# Patient Record
Sex: MALE | Race: BLACK OR AFRICAN AMERICAN
[De-identification: names, ages, dates, MRNs, and addresses within clinical notes are randomized per-mention and may not be internally consistent; named-entity substitution may affect disease eponyms.]

---

## 2018-12-13 ENCOUNTER — HOSPITAL ENCOUNTER (INPATIENT)
Dept: HOSPITAL 62 - ER | Age: 43
LOS: 7 days | Discharge: HOME | DRG: 638 | End: 2018-12-20
Attending: FAMILY MEDICINE | Admitting: FAMILY MEDICINE
Payer: COMMERCIAL

## 2018-12-13 DIAGNOSIS — I10: ICD-10-CM

## 2018-12-13 DIAGNOSIS — E86.0: ICD-10-CM

## 2018-12-13 DIAGNOSIS — N17.9: ICD-10-CM

## 2018-12-13 DIAGNOSIS — E11.10: Primary | ICD-10-CM

## 2018-12-13 DIAGNOSIS — Z91.14: ICD-10-CM

## 2018-12-13 LAB
ADD MANUAL DIFF: NO
ALBUMIN SERPL-MCNC: 5.1 G/DL (ref 3.5–5)
ALP SERPL-CCNC: 87 U/L (ref 38–126)
ALT SERPL-CCNC: 27 U/L (ref 21–72)
ANION GAP SERPL CALC-SCNC: 34 MMOL/L (ref 5–19)
AST SERPL-CCNC: 20 U/L (ref 17–59)
BASE EXCESS BLDV CALC-SCNC: -17.7 MMOL/L
BASOPHILS # BLD AUTO: 0.1 10^3/UL (ref 0–0.2)
BASOPHILS NFR BLD AUTO: 0.4 % (ref 0–2)
BILIRUB DIRECT SERPL-MCNC: 0.4 MG/DL (ref 0–0.4)
BILIRUB SERPL-MCNC: 0.6 MG/DL (ref 0.2–1.3)
BUN SERPL-MCNC: 26 MG/DL (ref 7–20)
CALCIUM: 10 MG/DL (ref 8.4–10.2)
CHLORIDE SERPL-SCNC: 98 MMOL/L (ref 98–107)
CO2 SERPL-SCNC: 9 MMOL/L (ref 22–30)
EOSINOPHIL # BLD AUTO: 0 10^3/UL (ref 0–0.6)
EOSINOPHIL NFR BLD AUTO: 0 % (ref 0–6)
ERYTHROCYTE [DISTWIDTH] IN BLOOD BY AUTOMATED COUNT: 13.1 % (ref 11.5–14)
GLUCOSE SERPL-MCNC: 616 MG/DL (ref 75–110)
HCO3 BLDV-SCNC: 11.5 MMOL/L (ref 20–32)
HCT VFR BLD CALC: 54.4 % (ref 37.9–51)
HGB BLD-MCNC: 17.9 G/DL (ref 13.5–17)
LYMPHOCYTES # BLD AUTO: 1 10^3/UL (ref 0.5–4.7)
LYMPHOCYTES NFR BLD AUTO: 6.6 % (ref 13–45)
MCH RBC QN AUTO: 31.6 PG (ref 27–33.4)
MCHC RBC AUTO-ENTMCNC: 32.9 G/DL (ref 32–36)
MCV RBC AUTO: 96 FL (ref 80–97)
MONOCYTES # BLD AUTO: 0.6 10^3/UL (ref 0.1–1.4)
MONOCYTES NFR BLD AUTO: 3.8 % (ref 3–13)
NEUTROPHILS # BLD AUTO: 13.9 10^3/UL (ref 1.7–8.2)
NEUTS SEG NFR BLD AUTO: 89.2 % (ref 42–78)
PCO2 BLDV: 39.1 MMHG (ref 35–63)
PH BLDV: 7.09 [PH] (ref 7.3–7.42)
PLATELET # BLD: 225 10^3/UL (ref 150–450)
POTASSIUM SERPL-SCNC: 5.5 MMOL/L (ref 3.6–5)
PROT SERPL-MCNC: 8.3 G/DL (ref 6.3–8.2)
RBC # BLD AUTO: 5.66 10^6/UL (ref 4.35–5.55)
SODIUM SERPL-SCNC: 140.6 MMOL/L (ref 137–145)
TOTAL CELLS COUNTED % (AUTO): 100 %
WBC # BLD AUTO: 15.6 10^3/UL (ref 4–10.5)

## 2018-12-13 PROCEDURE — 80053 COMPREHEN METABOLIC PANEL: CPT

## 2018-12-13 PROCEDURE — 82803 BLOOD GASES ANY COMBINATION: CPT

## 2018-12-13 PROCEDURE — 86701 HIV-1ANTIBODY: CPT

## 2018-12-13 PROCEDURE — 83735 ASSAY OF MAGNESIUM: CPT

## 2018-12-13 PROCEDURE — 80048 BASIC METABOLIC PNL TOTAL CA: CPT

## 2018-12-13 PROCEDURE — 36415 COLL VENOUS BLD VENIPUNCTURE: CPT

## 2018-12-13 PROCEDURE — 82962 GLUCOSE BLOOD TEST: CPT

## 2018-12-13 PROCEDURE — 85025 COMPLETE CBC W/AUTO DIFF WBC: CPT

## 2018-12-13 PROCEDURE — 81001 URINALYSIS AUTO W/SCOPE: CPT

## 2018-12-13 PROCEDURE — 80307 DRUG TEST PRSMV CHEM ANLYZR: CPT

## 2018-12-13 PROCEDURE — 93010 ELECTROCARDIOGRAM REPORT: CPT

## 2018-12-13 PROCEDURE — S0028 INJECTION, FAMOTIDINE, 20 MG: HCPCS

## 2018-12-13 PROCEDURE — 99291 CRITICAL CARE FIRST HOUR: CPT

## 2018-12-13 PROCEDURE — 96360 HYDRATION IV INFUSION INIT: CPT

## 2018-12-13 PROCEDURE — 83036 HEMOGLOBIN GLYCOSYLATED A1C: CPT

## 2018-12-13 PROCEDURE — 93005 ELECTROCARDIOGRAM TRACING: CPT

## 2018-12-13 NOTE — ER DOCUMENT REPORT
ED General





- General


Chief Complaint: High Blood Sugar


Stated Complaint: POSSIBLE HIGH BLOOD SUGAR


Time Seen by Provider: 12/13/18 22:05


Notes: 


Patient is a 43-year-old male with a history of type 2 diabetes who presents 

with nausea, fluctuating blood sugars, and feeling very dry and weak.  Patient 

says he felt like this once before and this was 2 years ago when he was first 

diagnosed with diabetes and was in DKA.  He is visiting from [place].  He 

supposed to be on a sliding scale insulin but has not been using his insulin.  

He is not on any oral medications.  He denies recent fevers or infections.  He 

denies pain except for some pain on his tongue from where he says he has some 

sores.  He has no history of any medical allergies.  He has no other complaints 

at this time.








- Related Data


Allergies/Adverse Reactions: 


 





No Known Allergies Allergy (Verified 12/13/18 21:24)


 











Past Medical History





- Social History


Smoking Status: Unknown if Ever Smoked


Frequency of alcohol use: None


Drug Abuse: None


Family History: Reviewed & Not Pertinent





Review of Systems





- Review of Systems


Notes: 





My Normal Review Basic





REVIEW OF SYSTEMS:


CONSTITUTIONAL :  Denies fever,  chills, or sweats.  Generalized weakness


EENT:   Denies eye, ear, throat, or mouth pain or symptoms.  Denies nasal or 

sinus congestion.


CARDIOVASCULAR:  Denies chest pain.


RESPIRATORY:  Denies cough, cold, or chest congestion.  Denies shortness of 

breath, difficulty breathing, or wheezing.


GASTROINTESTINAL:  Denies abdominal pain.  Nausea


GENITOURINARY:  Denies difficulty urinating, painful urination, burning, 

frequency, or blood in urine.


MUSCULOSKELETAL:  Denies neck or back pain or joint pain or swelling.


SKIN:   Denies rash or skin lesions.


NEUROLOGICAL:  Denies altered mental status or loss of consciousness. 


ALL OTHER SYSTEMS REVIEWED AND NEGATIVE.





Physical Exam





- Vital signs


Vitals: 


 











Temp Pulse Resp BP Pulse Ox


 


 97.4 F   131 H  17   111/87 H  98 


 


 12/13/18 21:31  12/13/18 21:31  12/13/18 21:31  12/13/18 21:31  12/13/18 21:31














- Notes


Notes: 





General Appearance: Well nourished, alert, cooperative, patient has Kussmals 

type respiration.


Vitals: reviewed, See vital signs table.


Head: no swelling or tenderness to the head


Eyes: PERRL, EOMI, Conjuctiva clear


Mouth: Dry tongue.  Dry mucous membranes.


Throat: No tonsillar inflammation, No airway obstruction, 


Neck: Supple, no neck tenderness, No thyromegaly


Lungs: No wheezing, No rales, No rhonci, No accessory muscle use, good air 

exchange bilaterally.


Heart: Tachycardic rate, Regular rythm, No murmur, no rub


Abdomen: Normal BS, soft, No rigidity, No abdominal tenderness, No guarding, no 

rebound, no abdominal masses, no organomegaly


Extremities:  good pulses in all extremities, no swelling or tenderness in the 

extremities, no edema.


Skin: warm, dry, appropriate color, no rash


Neuro: speech clear, oriented x 3, normal affect, responds appropriately to 

questions.





Course





- Re-evaluation


Re-evalutation: 





12/14/18 05:36


Patient's blood work did show he is acidotic and had findings consistent with 

DKA.  Potassium was slightly elevated related to his acidosis.  Patient was 

placed on maintenance IV drip after receiving the 2 her IV fluid bolus.  

Patient then placed on insulin drip.  I called and spoke with the hospitalist, 

Dr. Weiland, who agreed to accept the patient for admission.





Dictation of this chart was performed using voice recognition software; 

therefore, there may be some unintended grammatical errors.





- Vital Signs


Vital signs: 


 











Temp Pulse Resp BP Pulse Ox


 


 97.4 F   98   17   155/88 H  96 


 


 12/13/18 21:31  12/14/18 04:50  12/14/18 02:37  12/14/18 02:37  12/14/18 02:37














- Laboratory


Result Diagrams: 


 12/14/18 03:56





 12/14/18 03:59


Laboratory results interpreted by me: 


 











  12/13/18 12/13/18 12/13/18





  21:39 22:47 22:47


 


WBC   15.6 H 


 


RBC   5.66 H 


 


Hgb   17.9 H 


 


Hct   54.4 H 


 


Seg Neutrophils %   89.2 H 


 


Lymphocytes %   6.6 L 


 


Absolute Neutrophils   13.9 H 


 


VBG pH   


 


VBG HCO3   


 


Potassium    5.5 H


 


Carbon Dioxide    9 L*


 


Anion Gap    34 H


 


BUN    26 H


 


Creatinine    1.40 H


 


Est GFR (Non-Af Amer)    56 L


 


Glucose    616 H*


 


POC Glucose  502 H*  


 


Total Protein    8.3 H


 


Albumin    5.1 H


 


Urine Protein   


 


Urine Glucose (UA)   


 


Urine Ketones   














  12/13/18 12/13/18 12/14/18





  22:47 22:47 00:16


 


WBC   


 


RBC   


 


Hgb   


 


Hct   


 


Seg Neutrophils %   


 


Lymphocytes %   


 


Absolute Neutrophils   


 


VBG pH  7.09 L*  


 


VBG HCO3  11.5 L  


 


Potassium   


 


Carbon Dioxide   


 


Anion Gap   


 


BUN   


 


Creatinine   


 


Est GFR (Non-Af Amer)   


 


Glucose   


 


POC Glucose    447 H*


 


Total Protein   


 


Albumin   


 


Urine Protein   30 H 


 


Urine Glucose (UA)   >=500 H 


 


Urine Ketones   80 H 














- EKG Interpretation by Me


Additional EKG results interpreted by me: 





12/13/18 22:34


EKG is reviewed and interpreted by me.  EKG shows sinus tachycardia with a rate 

of 111 bpm.  No ST segment elevation or depression.  No ischemic T wave 

inversions.  OK interval, QRS duration are within normal range.  QT interval is 

prolonged.  No old EKG available for comparison.





Critical Care Note





- Critical Care Note


Total time excluding time spent on procedures (mins): 40


Comments: 





Critical care time for this patient including time spent on procedures proximal 

40 minutes due to frequent reevaluation rehydration with saline, management of 

insulin drip.





Discharge





- Discharge


Clinical Impression: 


DKA (diabetic ketoacidoses)


Qualifiers:


 Diabetes mellitus type: type 2 Diabetes mellitus complication detail: without 

coma Qualified Code(s): E11.10 - Type 2 diabetes mellitus with ketoacidosis 

without coma





Condition: Stable


Disposition: ADMITTED AS INPATIENT


Admitting Provider: Hospitalist


Unit Admitted: ICU

## 2018-12-14 LAB
ADD MANUAL DIFF: NO
ANION GAP SERPL CALC-SCNC: 26 MMOL/L (ref 5–19)
ANION GAP SERPL CALC-SCNC: 8 MMOL/L (ref 5–19)
ANION GAP SERPL CALC-SCNC: 9 MMOL/L (ref 5–19)
APPEARANCE UR: CLEAR
APTT PPP: (no result) S
ARTERIAL BLOOD FIO2: (no result)
ARTERIAL BLOOD H2CO3: 1.21 MMOL/L (ref 1.05–1.35)
ARTERIAL BLOOD HCO3: 30.4 MMOL/L (ref 20–24)
ARTERIAL BLOOD PCO2: 40.2 MMHG (ref 35–45)
ARTERIAL BLOOD PH: 7.5 (ref 7.35–7.45)
ARTERIAL BLOOD PO2: 80.1 MMHG (ref 80–100)
ARTERIAL BLOOD TOTAL CO2: 31.7 MMOL/L (ref 23–27)
BARBITURATES UR QL SCN: NEGATIVE
BASE EXCESS BLDA CALC-SCNC: 6.7 MMOL/L
BASOPHILS # BLD AUTO: 0 10^3/UL (ref 0–0.2)
BASOPHILS NFR BLD AUTO: 0.3 % (ref 0–2)
BILIRUB UR QL STRIP: NEGATIVE
BUN SERPL-MCNC: 17 MG/DL (ref 7–20)
BUN SERPL-MCNC: 20 MG/DL (ref 7–20)
BUN SERPL-MCNC: 24 MG/DL (ref 7–20)
CALCIUM: 8.2 MG/DL (ref 8.4–10.2)
CALCIUM: 8.3 MG/DL (ref 8.4–10.2)
CALCIUM: 9.5 MG/DL (ref 8.4–10.2)
CHLORIDE SERPL-SCNC: 101 MMOL/L (ref 98–107)
CHLORIDE SERPL-SCNC: 105 MMOL/L (ref 98–107)
CHLORIDE SERPL-SCNC: 99 MMOL/L (ref 98–107)
CO2 SERPL-SCNC: 11 MMOL/L (ref 22–30)
CO2 SERPL-SCNC: 28 MMOL/L (ref 22–30)
CO2 SERPL-SCNC: 30 MMOL/L (ref 22–30)
EOSINOPHIL # BLD AUTO: 0 10^3/UL (ref 0–0.6)
EOSINOPHIL NFR BLD AUTO: 0 % (ref 0–6)
ERYTHROCYTE [DISTWIDTH] IN BLOOD BY AUTOMATED COUNT: 12.9 % (ref 11.5–14)
GLUCOSE SERPL-MCNC: 181 MG/DL (ref 75–110)
GLUCOSE SERPL-MCNC: 235 MG/DL (ref 75–110)
GLUCOSE SERPL-MCNC: 509 MG/DL (ref 75–110)
GLUCOSE UR STRIP-MCNC: >=500 MG/DL
HCT VFR BLD CALC: 49.6 % (ref 37.9–51)
HGB BLD-MCNC: 16.6 G/DL (ref 13.5–17)
KETONES UR STRIP-MCNC: 80 MG/DL
LYMPHOCYTES # BLD AUTO: 1.2 10^3/UL (ref 0.5–4.7)
LYMPHOCYTES NFR BLD AUTO: 7.8 % (ref 13–45)
MCH RBC QN AUTO: 31 PG (ref 27–33.4)
MCHC RBC AUTO-ENTMCNC: 33.4 G/DL (ref 32–36)
MCV RBC AUTO: 93 FL (ref 80–97)
METHADONE UR QL SCN: NEGATIVE
MONOCYTES # BLD AUTO: 1.5 10^3/UL (ref 0.1–1.4)
MONOCYTES NFR BLD AUTO: 9.1 % (ref 3–13)
NEUTROPHILS # BLD AUTO: 13.2 10^3/UL (ref 1.7–8.2)
NEUTS SEG NFR BLD AUTO: 82.8 % (ref 42–78)
NITRITE UR QL STRIP: NEGATIVE
PCP UR QL SCN: NEGATIVE
PH UR STRIP: 5 [PH] (ref 5–9)
PLATELET # BLD: 192 10^3/UL (ref 150–450)
POTASSIUM SERPL-SCNC: 2.7 MMOL/L (ref 3.6–5)
POTASSIUM SERPL-SCNC: 3.2 MMOL/L (ref 3.6–5)
POTASSIUM SERPL-SCNC: 4.2 MMOL/L (ref 3.6–5)
PROT UR STRIP-MCNC: 30 MG/DL
RBC # BLD AUTO: 5.34 10^6/UL (ref 4.35–5.55)
SAO2 % BLDA: 96.7 % (ref 94–98)
SODIUM SERPL-SCNC: 134.9 MMOL/L (ref 137–145)
SODIUM SERPL-SCNC: 139.9 MMOL/L (ref 137–145)
SODIUM SERPL-SCNC: 141.7 MMOL/L (ref 137–145)
SP GR UR STRIP: 1.03
TOTAL CELLS COUNTED % (AUTO): 100 %
URINE AMPHETAMINES SCREEN: NEGATIVE
URINE BENZODIAZEPINES SCREEN: NEGATIVE
URINE COCAINE SCREEN: NEGATIVE
URINE MARIJUANA (THC) SCREEN: NEGATIVE
UROBILINOGEN UR-MCNC: NEGATIVE MG/DL (ref ?–2)
WBC # BLD AUTO: 16 10^3/UL (ref 4–10.5)

## 2018-12-14 RX ADMIN — SODIUM BICARBONATE PRN MLS/HR: 84 INJECTION INTRAVENOUS at 04:27

## 2018-12-14 RX ADMIN — SODIUM CHLORIDE PRN MLS/HR: 0.45 INJECTION, SOLUTION INTRAVENOUS at 04:33

## 2018-12-14 RX ADMIN — POTASSIUM CHLORIDE SCH MLS/HR: 29.8 INJECTION, SOLUTION INTRAVENOUS at 18:13

## 2018-12-14 RX ADMIN — LISINOPRIL SCH MG: 10 TABLET ORAL at 10:03

## 2018-12-14 RX ADMIN — HEPARIN SODIUM SCH UNIT: 5000 INJECTION, SOLUTION INTRAVENOUS; SUBCUTANEOUS at 22:31

## 2018-12-14 RX ADMIN — HEPARIN SODIUM SCH UNIT: 5000 INJECTION, SOLUTION INTRAVENOUS; SUBCUTANEOUS at 14:25

## 2018-12-14 RX ADMIN — METOCLOPRAMIDE SCH MG: 5 INJECTION, SOLUTION INTRAMUSCULAR; INTRAVENOUS at 08:11

## 2018-12-14 RX ADMIN — Medication SCH ML: at 14:25

## 2018-12-14 RX ADMIN — Medication SCH: at 07:23

## 2018-12-14 RX ADMIN — FAMOTIDINE SCH MG: 10 INJECTION INTRAVENOUS at 10:04

## 2018-12-14 RX ADMIN — HEPARIN SODIUM SCH UNIT: 5000 INJECTION, SOLUTION INTRAVENOUS; SUBCUTANEOUS at 07:23

## 2018-12-14 RX ADMIN — SODIUM CHLORIDE PRN MLS/HR: 0.45 INJECTION, SOLUTION INTRAVENOUS at 10:07

## 2018-12-14 RX ADMIN — SODIUM BICARBONATE PRN MLS/HR: 84 INJECTION INTRAVENOUS at 07:22

## 2018-12-14 RX ADMIN — METOCLOPRAMIDE SCH MG: 5 INJECTION, SOLUTION INTRAMUSCULAR; INTRAVENOUS at 12:00

## 2018-12-14 RX ADMIN — INSULIN LISPRO PRN UNIT: 100 INJECTION, SOLUTION INTRAVENOUS; SUBCUTANEOUS at 22:54

## 2018-12-14 RX ADMIN — INSULIN HUMAN PRN MLS/HR: 100 INJECTION, SOLUTION PARENTERAL at 08:22

## 2018-12-14 RX ADMIN — DOCUSATE SODIUM SCH: 100 CAPSULE, LIQUID FILLED ORAL at 10:01

## 2018-12-14 RX ADMIN — METOCLOPRAMIDE SCH MG: 5 INJECTION, SOLUTION INTRAMUSCULAR; INTRAVENOUS at 22:31

## 2018-12-14 RX ADMIN — METOCLOPRAMIDE SCH MG: 5 INJECTION, SOLUTION INTRAMUSCULAR; INTRAVENOUS at 15:13

## 2018-12-14 RX ADMIN — SODIUM CHLORIDE PRN MLS/HR: 0.45 INJECTION, SOLUTION INTRAVENOUS at 18:15

## 2018-12-14 RX ADMIN — FAMOTIDINE SCH MG: 10 INJECTION INTRAVENOUS at 22:31

## 2018-12-14 RX ADMIN — POTASSIUM CHLORIDE SCH MLS/HR: 29.8 INJECTION, SOLUTION INTRAVENOUS at 13:33

## 2018-12-14 RX ADMIN — Medication SCH: at 21:31

## 2018-12-14 RX ADMIN — MORPHINE SULFATE PRN MG: 10 INJECTION INTRAMUSCULAR; INTRAVENOUS; SUBCUTANEOUS at 22:31

## 2018-12-14 RX ADMIN — INSULIN HUMAN PRN MLS/HR: 100 INJECTION, SOLUTION PARENTERAL at 08:07

## 2018-12-14 RX ADMIN — DOCUSATE SODIUM SCH: 100 CAPSULE, LIQUID FILLED ORAL at 17:40

## 2018-12-14 RX ADMIN — POTASSIUM CHLORIDE SCH MLS/HR: 29.8 INJECTION, SOLUTION INTRAVENOUS at 15:13

## 2018-12-14 NOTE — PDOC H&P
History of Present Illness


Admission Date/PCP: 


  12/14/18 00:41





  





Patient complains of: Nausea and weakness


History of Present Illness: 


TASHI ANDINO is a 43 year old male who presented to the emergency room with a 

one-week history of progressively worsening nausea and weakness.  He admits to 

the associated symptoms of generalized malaise and anorexia for the last 5 days 

with a severely decreased oral intake of fluids (despite extreme thirst) 

associated with a severely dry mouth and thick whitish secretions covering his 

tongue and causing it to be sore.  He admits that he is a insulin-dependent 

type 2 diabetic with prior similar symptoms on one occasion due to diabetic 

ketoacidosis.  He recently came to this area from [place] without bringing a 

supply of insulin or his glucometer and testing strips.  He has not obtained 

insulin locally and therefore he has not been treated for more than 1 week.  He 

usually uses a short acting insulin on a sliding scale prior to meals as 

instructed by his primary care provider in [place].  In the emergency room he 

was found to have a pH of 7.09, a serum bicarbonate of 9, a creatinine of 1.4 

and a glucose of 661 with a urinalysis positive for 3+ ketones and 4+ glucose.  

He was also noted to be tachycardic and tachypneic, but was afebrile and 

normotensive.  Thus with a confirmed diagnosis of diabetic ketoacidosis he was 

admitted to the intensive care unit with an intravenous insulin infusion 

titrated per protocol, and intravenous bicarbonate infusion and morphine 

sulfate on a sliding scale as needed for pain or discomfort.  He will also 

receive high-volume IV fluids to replete his vascular volume and treat his 

dehydration and acute renal injury.





Past Medical History


Cardiac Medical History: Reports: Hypertension - Not taking any medications


   Denies: Coronary Artery Disease


Pulmonary Medical History: 


   Denies: Asthma, Chronic Obstructive Pulmonary Disease (COPD)


EENT Medical History: Reports: None


Neurological Medical History: 


   Denies: Migraine, Seizures


Endocrine Medical History: Reports: Diabetes Mellitus Type 2 - Ketoacidotic 

variety


   Denies: Diabetes Mellitus Type 1, Hyperthyroidism, Hypothyroidism


Renal/ Medical History: 


   Denies: Chronic Kidney Disease, Nephrolithiasis


Malignancy Medical History: Reports: None


GI Medical History: 


   Denies: Cirrhosis, Hepatitis


Musculoskeltal Medical History: 


   Denies: Arthritis, Fibromyalgia


Skin Medical History: 


   Denies: Eczema, Psoriasis


Psychiatric Medical History: Reports: Tobacco Dependency


   Denies: Alcohol Dependency, Substance Abuse


Traumatic Medical History: Reports: None


Hematology: 


   Denies: Anemia, Sickle Cell Disease, Bleeding Tendencies


Infectious Medical History: Reports: None





Past Surgical History


Past Surgical History: Reports: None





Social History


Information Source: Patient


Lives with: Spouse/Significant other


Smoking Status: Former Smoker - Quit 1 month ago


Frequency of Alcohol Use: None


Hx Recreational Drug Use: No


Hx Prescription Drug Abuse: No





- Advance Directive


Resuscitation Status: Full Code


Surrogate healthcare decision maker:: 


Angie his significant other





Family History


Family History: None


Parental Family History Reviewed: Yes


Children Family History Reviewed: No


Sibling(s) Family History Reviewed.: Yes





Medication/Allergy


Allergies/Adverse Reactions: 


 





No Known Allergies Allergy (Verified 12/13/18 21:24)


 











Review of Systems


Constitutional: PRESENT: as per HPI, anorexia, fatigue, weakness, other - 

Malaise.  ABSENT: chills, fever(s)


Eyes: ABSENT: visual disturbances, other - Ocular pain


Ears: ABSENT: hearing changes, other - Ear pain


Nose, Mouth, and Throat: PRESENT: as per HPI, other - Soreness of tongue.  

ABSENT: mouth pain, sore throat


Cardiovascular: ABSENT: chest pain, dyspnea on exertion, palpitations


Respiratory: ABSENT: cough, dyspnea


Gastrointestinal: PRESENT: nausea.  ABSENT: abdominal pain, constipation, 

diarrhea, vomiting


Genitourinary: ABSENT: dysuria, hematuria


Musculoskeletal: ABSENT: back pain, joint swelling


Integumentary: ABSENT: pruritus, rash


Neurological: ABSENT: confusion, convulsions, memory loss, syncope


Psychiatric: ABSENT: anxiety, depression


Endocrine: ABSENT: cold intolerance, heat intolerance


Hematologic/Lymphatic: ABSENT: easy bleeding, easy bruising





Physical Exam


Vital Signs: 


 











Temp Pulse Resp BP Pulse Ox


 


 97.4 F   131 H  22 H  150/99 H  98 


 


 12/13/18 21:31  12/13/18 21:31  12/13/18 23:00  12/13/18 22:58  12/13/18 23:00











General appearance: PRESENT: no acute distress, cooperative


Head exam: PRESENT: atraumatic, normocephalic


Eye exam: PRESENT: conjunctiva pink, EOMI


Ear exam: PRESENT: normal external ear exam.  ABSENT: bleeding


Mouth exam: PRESENT: dry mucosa, neck supple


Neck exam: ABSENT: JVD, thyromegaly, tracheal deviation


Respiratory exam: PRESENT: clear to auscultation flor, symmetrical, unlabored


Cardiovascular exam: PRESENT: RRR, tachycardia.  ABSENT: clicks, gallop, rubs


Pulses: PRESENT: normal radial pulses, normal dorsalis pedis pul


Vascular exam: PRESENT: normal capillary refill.  ABSENT: pallor


GI/Abdominal exam: PRESENT: normal bowel sounds, soft.  ABSENT: tenderness


Rectal exam: PRESENT: deferred


Extremities exam: ABSENT: joint swelling, pedal edema


Musculoskeletal exam: PRESENT: full ROM, normal inspection


Neurological exam: PRESENT: alert, oriented to person, oriented to place, 

oriented to time, oriented to situation, CN II-XII grossly intact.  ABSENT: 

motor sensory deficit


Psychiatric exam: PRESENT: appropriate affect, normal mood


Skin exam: PRESENT: dry, intact, warm - [id number].  ABSENT: jaundice, rash, 

urticaria





Assessment & Plan





- Diagnosis


(1) DM ketoacidosis type II, uncontrolled


Is this a current diagnosis for this admission?: Yes   


Plan: 


Patient was treated with an IV insulin infusion, and IV bicarbonate infusion, 

IV fluids and supportive cares with antiemetics for nausea utilizing Zofran and 

morphine sulfate intravenously on a sliding scale as needed for pain.  Serial 

metabolic profiles and arterial blood gases will be obtained to evaluate the 

patient's therapeutic response and make adjustments in his treatment.








(2) Acute kidney injury (nontraumatic)


Is this a current diagnosis for this admission?: Yes   


Plan: 


Patient will be treated with high volume IV fluids and serial assessments of 

his metabolic profile to evaluate his renal injury and its response to therapy.








(3) Dehydration


Is this a current diagnosis for this admission?: Yes   


Plan: 


Patient be treated for dehydration with high volume IV fluids and his clinical 

response will be evaluated over the course of his hospital stay.








(4) Hypertension


Qualifiers: 


   Hypertension type: essential hypertension   Qualified Code(s): I10 - 

Essential (primary) hypertension   


Is this a current diagnosis for this admission?: Yes   


Plan: 


Patient states he was told he had high blood pressure when his diabetic 

ketoacidosis was diagnosed about 2 years ago.  He has not been on any 

medication for hypertension since that time.  Initial antihypertensive therapy 

will be instituted utilizing lisinopril.








- Time


Time Spent: 50 to 70 Minutes


Critical Time spent with patient: Less than 15 minutes


Medications reviewed and adjusted accordingly: Yes


Anticipated discharge: Home





- Inpatient Certification


Based on my medical assessment, after consideration of the patient's 

comorbidities, presenting symptoms, or acuity I expect that the services needed 

warrant INPATIENT care.: Yes


I certify that my determination is in accordance with my understanding of 

Medicare's requirements for reasonable and necessary INPATIENT services [42 CFR 

412.3e].: Yes


Medical Necessity: Need Close Monitoring Due to Risk of Patient Decompensation, 

Need For IV Fluids, Risk of Complication if Not Cared For in Hospital

## 2018-12-14 NOTE — EKG REPORT
SEVERITY:- ABNORMAL ECG -

SINUS TACHYCARDIA

NONSPECIFIC T ABNORMALITIES, INFERIOR LEADS

PROLONGED QT INTERVAL

:

Confirmed by: Kavita Zeng MD 14-Dec-2018 10:00:05

## 2018-12-15 LAB
ADD MANUAL DIFF: NO
ANION GAP SERPL CALC-SCNC: 6 MMOL/L (ref 5–19)
BASOPHILS # BLD AUTO: 0 10^3/UL (ref 0–0.2)
BASOPHILS NFR BLD AUTO: 0.4 % (ref 0–2)
BUN SERPL-MCNC: 15 MG/DL (ref 7–20)
CALCIUM: 8.1 MG/DL (ref 8.4–10.2)
CHLORIDE SERPL-SCNC: 100 MMOL/L (ref 98–107)
CO2 SERPL-SCNC: 29 MMOL/L (ref 22–30)
EOSINOPHIL # BLD AUTO: 0.1 10^3/UL (ref 0–0.6)
EOSINOPHIL NFR BLD AUTO: 0.6 % (ref 0–6)
ERYTHROCYTE [DISTWIDTH] IN BLOOD BY AUTOMATED COUNT: 12.3 % (ref 11.5–14)
GLUCOSE SERPL-MCNC: 259 MG/DL (ref 75–110)
HCT VFR BLD CALC: 38.7 % (ref 37.9–51)
HGB BLD-MCNC: 13.5 G/DL (ref 13.5–17)
LYMPHOCYTES # BLD AUTO: 2.5 10^3/UL (ref 0.5–4.7)
LYMPHOCYTES NFR BLD AUTO: 29.4 % (ref 13–45)
MCH RBC QN AUTO: 31.4 PG (ref 27–33.4)
MCHC RBC AUTO-ENTMCNC: 34.8 G/DL (ref 32–36)
MCV RBC AUTO: 90 FL (ref 80–97)
MONOCYTES # BLD AUTO: 0.8 10^3/UL (ref 0.1–1.4)
MONOCYTES NFR BLD AUTO: 9 % (ref 3–13)
NEUTROPHILS # BLD AUTO: 5.2 10^3/UL (ref 1.7–8.2)
NEUTS SEG NFR BLD AUTO: 60.6 % (ref 42–78)
PLATELET # BLD: 141 10^3/UL (ref 150–450)
POTASSIUM SERPL-SCNC: 3.7 MMOL/L (ref 3.6–5)
RBC # BLD AUTO: 4.29 10^6/UL (ref 4.35–5.55)
SODIUM SERPL-SCNC: 134.6 MMOL/L (ref 137–145)
TOTAL CELLS COUNTED % (AUTO): 100 %
WBC # BLD AUTO: 8.6 10^3/UL (ref 4–10.5)

## 2018-12-15 RX ADMIN — FAMOTIDINE SCH MG: 10 INJECTION INTRAVENOUS at 22:52

## 2018-12-15 RX ADMIN — METOCLOPRAMIDE SCH MG: 5 INJECTION, SOLUTION INTRAMUSCULAR; INTRAVENOUS at 08:14

## 2018-12-15 RX ADMIN — METOCLOPRAMIDE SCH MG: 5 INJECTION, SOLUTION INTRAMUSCULAR; INTRAVENOUS at 16:10

## 2018-12-15 RX ADMIN — HEPARIN SODIUM SCH: 5000 INJECTION, SOLUTION INTRAVENOUS; SUBCUTANEOUS at 06:26

## 2018-12-15 RX ADMIN — INSULIN LISPRO PRN UNIT: 100 INJECTION, SOLUTION INTRAVENOUS; SUBCUTANEOUS at 08:14

## 2018-12-15 RX ADMIN — SODIUM CHLORIDE PRN MLS/HR: 0.45 INJECTION, SOLUTION INTRAVENOUS at 01:49

## 2018-12-15 RX ADMIN — DOCUSATE SODIUM SCH: 100 CAPSULE, LIQUID FILLED ORAL at 17:20

## 2018-12-15 RX ADMIN — LISINOPRIL SCH MG: 10 TABLET ORAL at 09:48

## 2018-12-15 RX ADMIN — DOCUSATE SODIUM SCH MG: 100 CAPSULE, LIQUID FILLED ORAL at 09:48

## 2018-12-15 RX ADMIN — METOCLOPRAMIDE SCH MG: 5 INJECTION, SOLUTION INTRAMUSCULAR; INTRAVENOUS at 11:54

## 2018-12-15 RX ADMIN — INSULIN DETEMIR SCH UNITS: 100 INJECTION, SOLUTION SUBCUTANEOUS at 09:50

## 2018-12-15 RX ADMIN — HEPARIN SODIUM SCH UNIT: 5000 INJECTION, SOLUTION INTRAVENOUS; SUBCUTANEOUS at 13:21

## 2018-12-15 RX ADMIN — INSULIN LISPRO PRN UNIT: 100 INJECTION, SOLUTION INTRAVENOUS; SUBCUTANEOUS at 22:50

## 2018-12-15 RX ADMIN — INSULIN LISPRO PRN UNIT: 100 INJECTION, SOLUTION INTRAVENOUS; SUBCUTANEOUS at 16:10

## 2018-12-15 RX ADMIN — INSULIN LISPRO PRN UNIT: 100 INJECTION, SOLUTION INTRAVENOUS; SUBCUTANEOUS at 11:54

## 2018-12-15 RX ADMIN — Medication SCH: at 22:53

## 2018-12-15 RX ADMIN — METOCLOPRAMIDE SCH MG: 5 INJECTION, SOLUTION INTRAMUSCULAR; INTRAVENOUS at 22:53

## 2018-12-15 RX ADMIN — FAMOTIDINE SCH MG: 10 INJECTION INTRAVENOUS at 09:48

## 2018-12-15 RX ADMIN — SODIUM CHLORIDE PRN MLS/HR: 0.45 INJECTION, SOLUTION INTRAVENOUS at 08:15

## 2018-12-15 RX ADMIN — INSULIN DETEMIR SCH: 100 INJECTION, SOLUTION SUBCUTANEOUS at 11:17

## 2018-12-15 RX ADMIN — MORPHINE SULFATE PRN MG: 10 INJECTION INTRAMUSCULAR; INTRAVENOUS; SUBCUTANEOUS at 22:57

## 2018-12-15 RX ADMIN — Medication SCH ML: at 13:21

## 2018-12-15 RX ADMIN — Medication SCH: at 06:27

## 2018-12-15 NOTE — PDOC PROGRESS REPORT
Subjective


Progress Note for:: 12/15/18


Subjective:: 





Patient is awake and responsive.  He tells me he is from [place] and here 

visiting.  Apparently he did not bring insulin with him.  He said he has 

insurance and will be able to get insulin if prescribed on discharge.  His DKA 

has resolved.  And he was transitioned to subcutaneous insulin.  Is tolerating 

diet very well.


Reason For Visit: 


ACUTE DIABETIC KETOACIDOSIS








Physical Exam


Vital Signs: 


 











Temp Pulse Resp BP Pulse Ox


 


 97.5 F   80   20   136/78 H  97 


 


 12/15/18 08:00  12/15/18 08:00  12/15/18 10:00  12/15/18 09:56  12/15/18 10:00








 Intake & Output











 12/14/18 12/15/18 12/16/18





 06:59 06:59 06:59


 


Intake Total 1000 4551 2000


 


Output Total 850 2700 500


 


Balance 150 1851 1500


 


Weight 173 lb 1.006 oz 185 lb 6.54 oz 











General appearance: PRESENT: no acute distress, cooperative


Eye exam: PRESENT: EOMI.  ABSENT: conjunctival injection


Mouth exam: PRESENT: neck supple


Neck exam: ABSENT: meningismus, tenderness, tracheostomy


Respiratory exam: PRESENT: clear to auscultation flor.  ABSENT: accessory muscle 

use


Cardiovascular exam: PRESENT: RRR


Pulses: PRESENT: normal carotid pulses


GI/Abdominal exam: PRESENT: normal bowel sounds, soft


Rectal exam: PRESENT: deferred


Neurological exam: PRESENT: alert, awake, oriented to person, oriented to place

, oriented to time, oriented to situation





Results


Laboratory Results: 


 





 12/15/18 06:03 





 12/15/18 06:03 





 











  12/14/18 12/14/18 12/14/18





  11:48 12:12 18:07


 


WBC   


 


RBC   


 


Hgb   


 


Hct   


 


MCV   


 


MCH   


 


MCHC   


 


RDW   


 


Plt Count   


 


Seg Neutrophils %   


 


Lymphocytes %   


 


Monocytes %   


 


Eosinophils %   


 


Basophils %   


 


Absolute Neutrophils   


 


Absolute Lymphocytes   


 


Absolute Monocytes   


 


Absolute Eosinophils   


 


Absolute Basophils   


 


Carbonic Acid   1.21 


 


HCO3/H2CO3 Ratio   25:1 


 


ABG pH   7.50 H 


 


ABG pCO2   40.2 


 


ABG pO2   80.1 


 


ABG HCO3   30.4 H 


 


ABG O2 Saturation   96.7 


 


ABG Base Excess   6.7 


 


FiO2   ROOM AIR 


 


Sodium  139.9   134.9 L


 


Potassium  2.7 L* D   3.2 L


 


Chloride  101   99


 


Carbon Dioxide  30  D   28


 


Anion Gap  9   8


 


BUN  20   17


 


Creatinine  0.73   0.82


 


Est GFR ( Amer)  > 60   > 60


 


Est GFR (Non-Af Amer)  > 60   > 60


 


Glucose  235 H   181 H


 


Calcium  8.3 L   8.2 L


 


Magnesium   














  12/15/18 12/15/18





  06:03 06:03


 


WBC   8.6


 


RBC   4.29 L


 


Hgb   13.5  D


 


Hct   38.7


 


MCV   90


 


MCH   31.4


 


MCHC   34.8


 


RDW   12.3


 


Plt Count   141 L


 


Seg Neutrophils %   60.6


 


Lymphocytes %   29.4


 


Monocytes %   9.0


 


Eosinophils %   0.6


 


Basophils %   0.4


 


Absolute Neutrophils   5.2


 


Absolute Lymphocytes   2.5


 


Absolute Monocytes   0.8


 


Absolute Eosinophils   0.1


 


Absolute Basophils   0.0


 


Carbonic Acid  


 


HCO3/H2CO3 Ratio  


 


ABG pH  


 


ABG pCO2  


 


ABG pO2  


 


ABG HCO3  


 


ABG O2 Saturation  


 


ABG Base Excess  


 


FiO2  


 


Sodium  134.6 L 


 


Potassium  3.7 


 


Chloride  100 


 


Carbon Dioxide  29 


 


Anion Gap  6 


 


BUN  15 


 


Creatinine  0.75 


 


Est GFR ( Amer)  > 60 


 


Est GFR (Non-Af Amer)  > 60 


 


Glucose  259 H 


 


Calcium  8.1 L 


 


Magnesium  2.2 














Assessment & Plan





- Diagnosis


(1) DKA (diabetic ketoacidoses)


Qualifiers: 


   Diabetes mellitus type: type 2   Diabetes mellitus complication detail: 

without coma   Qualified Code(s): E11.10 - Type 2 diabetes mellitus with 

ketoacidosis without coma   


Is this a current diagnosis for this admission?: Yes   


Plan: 


 resolved.  Patient is off insulin drip.  He was transitioned to subcutaneous 

insulin yesterday.  Add Levemir twice daily.








(2) Acute kidney injury (nontraumatic)


Is this a current diagnosis for this admission?: Yes   


Plan: 


Resolved after IV hydration.  Monitor renal function.








(3) Dehydration


Is this a current diagnosis for this admission?: Yes   


Plan: 


Due to diabetic ketoacidosis.  Resolved after IV hydration.








(4) Hypertension


Qualifiers: 


   Hypertension type: essential hypertension   Qualified Code(s): I10 - 

Essential (primary) hypertension   


Is this a current diagnosis for this admission?: Yes   


Plan: 


Continue home medications.  Monitor blood pressure.








- Plan Summary


Plan Summary: 





Okay to transfer to the floor out of the ICU.  Continue to monitor glucose 

levels.  Continue diabetic diet.  Possible discharge tomorrow.

## 2018-12-16 LAB
ADD MANUAL DIFF: NO
ANION GAP SERPL CALC-SCNC: 8 MMOL/L (ref 5–19)
BASOPHILS # BLD AUTO: 0 10^3/UL (ref 0–0.2)
BASOPHILS NFR BLD AUTO: 0.4 % (ref 0–2)
BUN SERPL-MCNC: 10 MG/DL (ref 7–20)
CALCIUM: 8.4 MG/DL (ref 8.4–10.2)
CHLORIDE SERPL-SCNC: 98 MMOL/L (ref 98–107)
CO2 SERPL-SCNC: 34 MMOL/L (ref 22–30)
EOSINOPHIL # BLD AUTO: 0 10^3/UL (ref 0–0.6)
EOSINOPHIL NFR BLD AUTO: 0.5 % (ref 0–6)
ERYTHROCYTE [DISTWIDTH] IN BLOOD BY AUTOMATED COUNT: 12.5 % (ref 11.5–14)
GLUCOSE SERPL-MCNC: 308 MG/DL (ref 75–110)
HCT VFR BLD CALC: 39.5 % (ref 37.9–51)
HGB BLD-MCNC: 13.6 G/DL (ref 13.5–17)
LYMPHOCYTES # BLD AUTO: 2.5 10^3/UL (ref 0.5–4.7)
LYMPHOCYTES NFR BLD AUTO: 38.1 % (ref 13–45)
MCH RBC QN AUTO: 31.2 PG (ref 27–33.4)
MCHC RBC AUTO-ENTMCNC: 34.5 G/DL (ref 32–36)
MCV RBC AUTO: 91 FL (ref 80–97)
MONOCYTES # BLD AUTO: 0.6 10^3/UL (ref 0.1–1.4)
MONOCYTES NFR BLD AUTO: 8.4 % (ref 3–13)
NEUTROPHILS # BLD AUTO: 3.5 10^3/UL (ref 1.7–8.2)
NEUTS SEG NFR BLD AUTO: 52.6 % (ref 42–78)
PLATELET # BLD: 134 10^3/UL (ref 150–450)
POTASSIUM SERPL-SCNC: 3.2 MMOL/L (ref 3.6–5)
RBC # BLD AUTO: 4.36 10^6/UL (ref 4.35–5.55)
SODIUM SERPL-SCNC: 139.5 MMOL/L (ref 137–145)
TOTAL CELLS COUNTED % (AUTO): 100 %
WBC # BLD AUTO: 6.6 10^3/UL (ref 4–10.5)

## 2018-12-16 RX ADMIN — NYSTATIN SCH UNIT: 100000 SUSPENSION ORAL at 18:48

## 2018-12-16 RX ADMIN — NYSTATIN SCH UNIT: 100000 SUSPENSION ORAL at 14:56

## 2018-12-16 RX ADMIN — Medication SCH ML: at 14:57

## 2018-12-16 RX ADMIN — METOCLOPRAMIDE HYDROCHLORIDE SCH MG: 10 TABLET ORAL at 13:05

## 2018-12-16 RX ADMIN — INSULIN DETEMIR SCH UNITS: 100 INJECTION, SOLUTION SUBCUTANEOUS at 22:19

## 2018-12-16 RX ADMIN — SUCRALFATE SCH GM: 1 SUSPENSION ORAL at 22:20

## 2018-12-16 RX ADMIN — HEPARIN SODIUM SCH UNIT: 5000 INJECTION, SOLUTION INTRAVENOUS; SUBCUTANEOUS at 14:55

## 2018-12-16 RX ADMIN — METOCLOPRAMIDE HYDROCHLORIDE SCH MG: 10 TABLET ORAL at 16:53

## 2018-12-16 RX ADMIN — HEPARIN SODIUM SCH: 5000 INJECTION, SOLUTION INTRAVENOUS; SUBCUTANEOUS at 22:20

## 2018-12-16 RX ADMIN — HEPARIN SODIUM SCH: 5000 INJECTION, SOLUTION INTRAVENOUS; SUBCUTANEOUS at 06:05

## 2018-12-16 RX ADMIN — METOCLOPRAMIDE HYDROCHLORIDE SCH: 10 TABLET ORAL at 08:47

## 2018-12-16 RX ADMIN — Medication SCH: at 22:18

## 2018-12-16 RX ADMIN — INSULIN LISPRO PRN UNIT: 100 INJECTION, SOLUTION INTRAVENOUS; SUBCUTANEOUS at 08:07

## 2018-12-16 RX ADMIN — METOCLOPRAMIDE HYDROCHLORIDE SCH MG: 10 TABLET ORAL at 22:21

## 2018-12-16 RX ADMIN — LISINOPRIL SCH MG: 10 TABLET ORAL at 10:16

## 2018-12-16 RX ADMIN — DOCUSATE SODIUM SCH: 100 CAPSULE, LIQUID FILLED ORAL at 18:47

## 2018-12-16 RX ADMIN — Medication SCH: at 06:05

## 2018-12-16 RX ADMIN — SUCRALFATE SCH GM: 1 SUSPENSION ORAL at 08:07

## 2018-12-16 RX ADMIN — INSULIN LISPRO SCH UNIT: 100 INJECTION, SOLUTION INTRAVENOUS; SUBCUTANEOUS at 16:55

## 2018-12-16 RX ADMIN — NYSTATIN SCH UNIT: 100000 SUSPENSION ORAL at 13:06

## 2018-12-16 RX ADMIN — FAMOTIDINE SCH MG: 20 TABLET, FILM COATED ORAL at 16:55

## 2018-12-16 RX ADMIN — NYSTATIN SCH UNIT: 100000 SUSPENSION ORAL at 22:18

## 2018-12-16 RX ADMIN — FAMOTIDINE SCH MG: 20 TABLET, FILM COATED ORAL at 08:07

## 2018-12-16 RX ADMIN — SUCRALFATE SCH GM: 1 SUSPENSION ORAL at 13:01

## 2018-12-16 RX ADMIN — INSULIN LISPRO SCH UNIT: 100 INJECTION, SOLUTION INTRAVENOUS; SUBCUTANEOUS at 13:01

## 2018-12-16 RX ADMIN — DOCUSATE SODIUM SCH MG: 100 CAPSULE, LIQUID FILLED ORAL at 10:16

## 2018-12-16 RX ADMIN — FAMOTIDINE SCH MG: 20 TABLET, FILM COATED ORAL at 22:21

## 2018-12-16 RX ADMIN — SUCRALFATE SCH GM: 1 SUSPENSION ORAL at 16:55

## 2018-12-16 RX ADMIN — INSULIN DETEMIR SCH UNITS: 100 INJECTION, SOLUTION SUBCUTANEOUS at 10:15

## 2018-12-16 RX ADMIN — HEPARIN SODIUM SCH: 5000 INJECTION, SOLUTION INTRAVENOUS; SUBCUTANEOUS at 00:19

## 2018-12-16 RX ADMIN — FAMOTIDINE SCH MG: 20 TABLET, FILM COATED ORAL at 13:02

## 2018-12-16 NOTE — PDOC PROGRESS REPORT
Subjective


Subjective:: 





Patient is awake and responsive.  Patient is tolerating diet.  He was started 

on subcu Levemir and sliding scale.  His glucose levels are still in the 300s.


Reason For Visit: 


ACUTE DIABETIC KETOACIDOSIS








Physical Exam


Vital Signs: 


 











Temp Pulse Resp BP Pulse Ox


 


 98.5 F   68   15   135/84 H  98 


 


 12/16/18 07:47  12/16/18 07:47  12/16/18 07:47  12/16/18 07:47  12/16/18 07:47








 Intake & Output











 12/15/18 12/16/18 12/17/18





 06:59 06:59 06:59


 


Intake Total 4551 2543 


 


Output Total 2700 2450 


 


Balance 1851 93 


 


Weight 185 lb 6.54 oz 183 lb 6.793 oz 











Exam: 








General appearance:  no acute distress, cooperative


Eye exam:  EOMI.  no conjunctival injection


Mouth exam:  neck supple


Neck exam: no meningismus, tenderness, tracheostomy


Respiratory exam:  clear to auscultation flor.  no accessory muscle use


Cardiovascular exam:  RRR


Pulses: normal carotid pulses


GI/Abdominal exam:  normal bowel sounds, soft


Rectal exam:  deferred


Neurological exam:  alert, awake, oriented to person, oriented to place, 

oriented to time, oriented to situation





Results


Laboratory Results: 


 





 12/16/18 07:03 





 12/16/18 07:03 





 











  12/16/18 12/16/18





  07:03 07:03


 


WBC  6.6 


 


RBC  4.36 


 


Hgb  13.6 


 


Hct  39.5 


 


MCV  91 


 


MCH  31.2 


 


MCHC  34.5 


 


RDW  12.5 


 


Plt Count  134 L 


 


Seg Neutrophils %  52.6 


 


Lymphocytes %  38.1 


 


Monocytes %  8.4 


 


Eosinophils %  0.5 


 


Basophils %  0.4 


 


Absolute Neutrophils  3.5 


 


Absolute Lymphocytes  2.5 


 


Absolute Monocytes  0.6 


 


Absolute Eosinophils  0.0 


 


Absolute Basophils  0.0 


 


Sodium   139.5


 


Potassium   3.2 L


 


Chloride   98


 


Carbon Dioxide   34 H


 


Anion Gap   8


 


BUN   10


 


Creatinine   0.77


 


Est GFR ( Amer)   > 60


 


Est GFR (Non-Af Amer)   > 60


 


Glucose   308 H


 


Calcium   8.4














Assessment & Plan





- Diagnosis


(1) DKA (diabetic ketoacidoses)


Qualifiers: 


   Diabetes mellitus type: type 2   Diabetes mellitus complication detail: 

without coma   Qualified Code(s): E11.10 - Type 2 diabetes mellitus with 

ketoacidosis without coma   


Is this a current diagnosis for this admission?: Yes   


Plan: 


 resolved.  Patient is off insulin drip.  He was transitioned to subcutaneous 

insulin yesterday.  Glucose levels are still in the 300s.  Increase Levemir to 

20 units twice daily and NovoLog 9 units before meals.  Monitor glucose levels.








(2) Acute kidney injury (nontraumatic)


Is this a current diagnosis for this admission?: Yes   


Plan: 


Resolved after IV hydration.  Monitor renal function.








(3) Dehydration


Is this a current diagnosis for this admission?: Yes   


Plan: 


Due to diabetic ketoacidosis.  Resolved after IV hydration.








(4) Hypertension


Qualifiers: 


   Hypertension type: essential hypertension   Qualified Code(s): I10 - 

Essential (primary) hypertension   


Is this a current diagnosis for this admission?: Yes   


Plan: 


Continue home medications.  Monitor blood pressure.

## 2018-12-17 LAB
ADD MANUAL DIFF: NO
ANION GAP SERPL CALC-SCNC: 4 MMOL/L (ref 5–19)
BASOPHILS # BLD AUTO: 0 10^3/UL (ref 0–0.2)
BASOPHILS NFR BLD AUTO: 0.5 % (ref 0–2)
BUN SERPL-MCNC: 15 MG/DL (ref 7–20)
CALCIUM: 9.2 MG/DL (ref 8.4–10.2)
CHLORIDE SERPL-SCNC: 102 MMOL/L (ref 98–107)
CO2 SERPL-SCNC: 35 MMOL/L (ref 22–30)
EOSINOPHIL # BLD AUTO: 0.1 10^3/UL (ref 0–0.6)
EOSINOPHIL NFR BLD AUTO: 1.7 % (ref 0–6)
ERYTHROCYTE [DISTWIDTH] IN BLOOD BY AUTOMATED COUNT: 12.4 % (ref 11.5–14)
GLUCOSE SERPL-MCNC: 608 MG/DL (ref 75–110)
HCT VFR BLD CALC: 39.7 % (ref 37.9–51)
HGB BLD-MCNC: 13.4 G/DL (ref 13.5–17)
LYMPHOCYTES # BLD AUTO: 2.1 10^3/UL (ref 0.5–4.7)
LYMPHOCYTES NFR BLD AUTO: 34.3 % (ref 13–45)
MCH RBC QN AUTO: 31.4 PG (ref 27–33.4)
MCHC RBC AUTO-ENTMCNC: 33.7 G/DL (ref 32–36)
MCV RBC AUTO: 93 FL (ref 80–97)
MONOCYTES # BLD AUTO: 0.6 10^3/UL (ref 0.1–1.4)
MONOCYTES NFR BLD AUTO: 9.2 % (ref 3–13)
NEUTROPHILS # BLD AUTO: 3.4 10^3/UL (ref 1.7–8.2)
NEUTS SEG NFR BLD AUTO: 54.3 % (ref 42–78)
PLATELET # BLD: 144 10^3/UL (ref 150–450)
POTASSIUM SERPL-SCNC: 4.4 MMOL/L (ref 3.6–5)
RBC # BLD AUTO: 4.27 10^6/UL (ref 4.35–5.55)
SODIUM SERPL-SCNC: 140.7 MMOL/L (ref 137–145)
TOTAL CELLS COUNTED % (AUTO): 100 %
WBC # BLD AUTO: 6.3 10^3/UL (ref 4–10.5)

## 2018-12-17 RX ADMIN — INSULIN LISPRO SCH UNIT: 100 INJECTION, SOLUTION INTRAVENOUS; SUBCUTANEOUS at 12:07

## 2018-12-17 RX ADMIN — FAMOTIDINE SCH MG: 20 TABLET, FILM COATED ORAL at 16:53

## 2018-12-17 RX ADMIN — INSULIN LISPRO SCH UNIT: 100 INJECTION, SOLUTION INTRAVENOUS; SUBCUTANEOUS at 16:54

## 2018-12-17 RX ADMIN — HEPARIN SODIUM SCH UNIT: 5000 INJECTION, SOLUTION INTRAVENOUS; SUBCUTANEOUS at 05:29

## 2018-12-17 RX ADMIN — Medication SCH ML: at 05:29

## 2018-12-17 RX ADMIN — SUCRALFATE SCH GM: 1 SUSPENSION ORAL at 09:25

## 2018-12-17 RX ADMIN — INSULIN DETEMIR SCH UNITS: 100 INJECTION, SOLUTION SUBCUTANEOUS at 21:33

## 2018-12-17 RX ADMIN — INSULIN DETEMIR SCH UNITS: 100 INJECTION, SOLUTION SUBCUTANEOUS at 09:50

## 2018-12-17 RX ADMIN — METOCLOPRAMIDE HYDROCHLORIDE SCH MG: 10 TABLET ORAL at 16:56

## 2018-12-17 RX ADMIN — SUCRALFATE SCH GM: 1 SUSPENSION ORAL at 21:31

## 2018-12-17 RX ADMIN — METOCLOPRAMIDE HYDROCHLORIDE SCH MG: 10 TABLET ORAL at 21:34

## 2018-12-17 RX ADMIN — FAMOTIDINE SCH MG: 20 TABLET, FILM COATED ORAL at 12:09

## 2018-12-17 RX ADMIN — DOCUSATE SODIUM SCH: 100 CAPSULE, LIQUID FILLED ORAL at 18:55

## 2018-12-17 RX ADMIN — NYSTATIN SCH UNIT: 100000 SUSPENSION ORAL at 16:50

## 2018-12-17 RX ADMIN — FAMOTIDINE SCH: 20 TABLET, FILM COATED ORAL at 12:06

## 2018-12-17 RX ADMIN — METOCLOPRAMIDE HYDROCHLORIDE SCH MG: 10 TABLET ORAL at 09:49

## 2018-12-17 RX ADMIN — METOCLOPRAMIDE HYDROCHLORIDE SCH MG: 10 TABLET ORAL at 12:10

## 2018-12-17 RX ADMIN — NYSTATIN SCH UNIT: 100000 SUSPENSION ORAL at 09:26

## 2018-12-17 RX ADMIN — NYSTATIN SCH UNIT: 100000 SUSPENSION ORAL at 21:31

## 2018-12-17 RX ADMIN — FAMOTIDINE SCH MG: 20 TABLET, FILM COATED ORAL at 21:33

## 2018-12-17 RX ADMIN — NYSTATIN SCH: 100000 SUSPENSION ORAL at 18:55

## 2018-12-17 RX ADMIN — Medication SCH ML: at 16:51

## 2018-12-17 RX ADMIN — LISINOPRIL SCH MG: 10 TABLET ORAL at 09:26

## 2018-12-17 RX ADMIN — HEPARIN SODIUM SCH UNIT: 5000 INJECTION, SOLUTION INTRAVENOUS; SUBCUTANEOUS at 21:32

## 2018-12-17 RX ADMIN — HEPARIN SODIUM SCH UNIT: 5000 INJECTION, SOLUTION INTRAVENOUS; SUBCUTANEOUS at 16:55

## 2018-12-17 RX ADMIN — SUCRALFATE SCH GM: 1 SUSPENSION ORAL at 16:56

## 2018-12-17 RX ADMIN — DOCUSATE SODIUM SCH: 100 CAPSULE, LIQUID FILLED ORAL at 09:46

## 2018-12-17 RX ADMIN — Medication SCH ML: at 21:34

## 2018-12-17 RX ADMIN — SUCRALFATE SCH GM: 1 SUSPENSION ORAL at 12:09

## 2018-12-17 NOTE — PDOC PROGRESS REPORT
Subjective


Progress Note for:: 12/17/18


Subjective:: 





Patient is awake and responsive.  


Patient is tolerating diet.  


He was started on subcu Levemir and sliding scale.  


His glucose levels are still in the high 300s.


Reason For Visit: 


ACUTE DIABETIC KETOACIDOSIS








Physical Exam


Vital Signs: 


 











Temp Pulse Resp BP Pulse Ox


 


 98.2 F   85   14   128/93 H  99 


 


 12/17/18 07:47  12/17/18 07:47  12/17/18 07:47  12/17/18 07:47  12/17/18 07:47








 Intake & Output











 12/16/18 12/17/18 12/18/18





 06:59 06:59 06:59


 


Intake Total 2543 510 


 


Output Total 2450 300 


 


Balance 93 210 


 


Weight 183 lb 6.793 oz 175 lb 7.807 oz 











Exam: 





General appearance:  no acute distress, cooperative


Eye exam:  EOMI.  no conjunctival injection


Mouth exam:  neck supple


Neck exam: no meningismus, tenderness, tracheostomy


Respiratory exam:  clear to auscultation flor.  no accessory muscle use


Cardiovascular exam:  RRR


Pulses: normal carotid pulses


GI/Abdominal exam:  normal bowel sounds, soft


Rectal exam:  deferred


Neurological exam:  alert, awake, oriented to person, oriented to place, 

oriented to time, oriented to situation





Results


Laboratory Results: 


 





 12/17/18 05:09 





 12/17/18 05:09 





 











  12/17/18 12/17/18





  05:09 05:09


 


WBC  6.3 


 


RBC  4.27 L 


 


Hgb  13.4 L 


 


Hct  39.7 


 


MCV  93 


 


MCH  31.4 


 


MCHC  33.7 


 


RDW  12.4 


 


Plt Count  144 L 


 


Seg Neutrophils %  54.3 


 


Lymphocytes %  34.3 


 


Monocytes %  9.2 


 


Eosinophils %  1.7 


 


Basophils %  0.5 


 


Absolute Neutrophils  3.4 


 


Absolute Lymphocytes  2.1 


 


Absolute Monocytes  0.6 


 


Absolute Eosinophils  0.1 


 


Absolute Basophils  0.0 


 


Sodium   140.7


 


Potassium   4.4  D


 


Chloride   102


 


Carbon Dioxide   35 H


 


Anion Gap   4 L


 


BUN   15


 


Creatinine   0.79


 


Est GFR ( Amer)   > 60


 


Est GFR (Non-Af Amer)   > 60


 


Glucose   608 H*


 


Calcium   9.2














Assessment & Plan





- Diagnosis


(1) DKA (diabetic ketoacidoses)


Qualifiers: 


   Diabetes mellitus type: type 2   Diabetes mellitus complication detail: 

without coma   Qualified Code(s): E11.10 - Type 2 diabetes mellitus with 

ketoacidosis without coma   


Is this a current diagnosis for this admission?: Yes   


Plan: 


 resolved.  Patient is off insulin drip.  He was transitioned to subcutaneous 

insulin 12/15/2018.  Glucose levels are still in the high 300s.  Increase 

Levemir to 30 units twice daily and NovoLog 13 units before meals.  Monitor 

glucose levels.








(2) Acute kidney injury (nontraumatic)


Is this a current diagnosis for this admission?: Yes   


Plan: 


Resolved after IV hydration.  Monitor renal function.








(3) Dehydration


Is this a current diagnosis for this admission?: Yes   


Plan: 


Due to diabetic ketoacidosis.  Resolved after IV hydration.








(4) Hypertension


Qualifiers: 


   Hypertension type: essential hypertension   Qualified Code(s): I10 - 

Essential (primary) hypertension   


Is this a current diagnosis for this admission?: Yes   


Plan: 


Continue home medications.  Monitor blood pressure.

## 2018-12-18 LAB
ANION GAP SERPL CALC-SCNC: 7 MMOL/L (ref 5–19)
BUN SERPL-MCNC: 15 MG/DL (ref 7–20)
CALCIUM: 9.7 MG/DL (ref 8.4–10.2)
CHLORIDE SERPL-SCNC: 99 MMOL/L (ref 98–107)
CO2 SERPL-SCNC: 35 MMOL/L (ref 22–30)
GLUCOSE SERPL-MCNC: 300 MG/DL (ref 75–110)
POTASSIUM SERPL-SCNC: 4 MMOL/L (ref 3.6–5)
SODIUM SERPL-SCNC: 141.3 MMOL/L (ref 137–145)

## 2018-12-18 RX ADMIN — SUCRALFATE SCH GM: 1 SUSPENSION ORAL at 08:37

## 2018-12-18 RX ADMIN — NYSTATIN SCH UNIT: 100000 SUSPENSION ORAL at 10:23

## 2018-12-18 RX ADMIN — SUCRALFATE SCH GM: 1 SUSPENSION ORAL at 17:30

## 2018-12-18 RX ADMIN — METOCLOPRAMIDE HYDROCHLORIDE SCH MG: 10 TABLET ORAL at 08:37

## 2018-12-18 RX ADMIN — Medication SCH ML: at 22:02

## 2018-12-18 RX ADMIN — FAMOTIDINE SCH MG: 20 TABLET, FILM COATED ORAL at 17:32

## 2018-12-18 RX ADMIN — INSULIN LISPRO SCH UNIT: 100 INJECTION, SOLUTION INTRAVENOUS; SUBCUTANEOUS at 17:28

## 2018-12-18 RX ADMIN — METOCLOPRAMIDE HYDROCHLORIDE SCH MG: 10 TABLET ORAL at 17:30

## 2018-12-18 RX ADMIN — METOCLOPRAMIDE HYDROCHLORIDE SCH MG: 10 TABLET ORAL at 22:02

## 2018-12-18 RX ADMIN — FAMOTIDINE SCH MG: 20 TABLET, FILM COATED ORAL at 12:14

## 2018-12-18 RX ADMIN — SUCRALFATE SCH GM: 1 SUSPENSION ORAL at 21:58

## 2018-12-18 RX ADMIN — Medication SCH ML: at 06:24

## 2018-12-18 RX ADMIN — FAMOTIDINE SCH MG: 20 TABLET, FILM COATED ORAL at 22:01

## 2018-12-18 RX ADMIN — INSULIN DETEMIR SCH: 100 INJECTION, SOLUTION SUBCUTANEOUS at 12:17

## 2018-12-18 RX ADMIN — NYSTATIN SCH UNIT: 100000 SUSPENSION ORAL at 22:01

## 2018-12-18 RX ADMIN — LISINOPRIL SCH MG: 10 TABLET ORAL at 10:22

## 2018-12-18 RX ADMIN — HEPARIN SODIUM SCH UNIT: 5000 INJECTION, SOLUTION INTRAVENOUS; SUBCUTANEOUS at 06:24

## 2018-12-18 RX ADMIN — SUCRALFATE SCH GM: 1 SUSPENSION ORAL at 12:11

## 2018-12-18 RX ADMIN — DOCUSATE SODIUM SCH: 100 CAPSULE, LIQUID FILLED ORAL at 10:23

## 2018-12-18 RX ADMIN — INSULIN LISPRO SCH UNIT: 100 INJECTION, SOLUTION INTRAVENOUS; SUBCUTANEOUS at 08:35

## 2018-12-18 RX ADMIN — Medication SCH ML: at 16:12

## 2018-12-18 RX ADMIN — DOCUSATE SODIUM SCH: 100 CAPSULE, LIQUID FILLED ORAL at 17:32

## 2018-12-18 RX ADMIN — HEPARIN SODIUM SCH UNIT: 5000 INJECTION, SOLUTION INTRAVENOUS; SUBCUTANEOUS at 21:59

## 2018-12-18 RX ADMIN — NYSTATIN SCH UNIT: 100000 SUSPENSION ORAL at 16:10

## 2018-12-18 RX ADMIN — INSULIN LISPRO SCH UNIT: 100 INJECTION, SOLUTION INTRAVENOUS; SUBCUTANEOUS at 12:13

## 2018-12-18 RX ADMIN — FAMOTIDINE SCH MG: 20 TABLET, FILM COATED ORAL at 08:37

## 2018-12-18 RX ADMIN — METOCLOPRAMIDE HYDROCHLORIDE SCH MG: 10 TABLET ORAL at 12:11

## 2018-12-18 RX ADMIN — INSULIN LISPRO SCH: 100 INJECTION, SOLUTION INTRAVENOUS; SUBCUTANEOUS at 12:20

## 2018-12-18 RX ADMIN — NYSTATIN SCH UNIT: 100000 SUSPENSION ORAL at 17:30

## 2018-12-18 RX ADMIN — INSULIN DETEMIR SCH UNITS: 100 INJECTION, SOLUTION SUBCUTANEOUS at 10:24

## 2018-12-18 RX ADMIN — HEPARIN SODIUM SCH UNIT: 5000 INJECTION, SOLUTION INTRAVENOUS; SUBCUTANEOUS at 16:10

## 2018-12-18 RX ADMIN — INSULIN DETEMIR SCH UNITS: 100 INJECTION, SOLUTION SUBCUTANEOUS at 22:02

## 2018-12-18 NOTE — PDOC PROGRESS REPORT
Subjective


Progress Note for:: 12/18/18


Subjective:: 


This is a 43 yr old male with  PMH of insulin-dependent diabetes mellitus, 

history of DKA, noncompliance to diabetic medications, hypertension who was 

admitted with DKA.





He was initially on insulin drip and was on IV fluids.  Patient's DKA has 

resolved.





No acute event overnight.  Patient sugars have been running high in the 300s.  

He is tolerating diet well.  He denies acute complaints.  Denies chest pain or 

shortness of breath abdominal pain.  He says that he did self discontinue his 

insulin before as he thought his diabetes who just improved with exercise and 

dietary restrictions.











Reason For Visit: 


ACUTE DIABETIC KETOACIDOSIS








Physical Exam


Vital Signs: 


                                        











Temp Pulse Resp BP Pulse Ox


 


 98.9 F   90   16   131/85 H  100 


 


 12/18/18 07:35  12/18/18 07:35  12/18/18 07:35  12/18/18 07:35  12/18/18 07:35








                                 Intake & Output











 12/17/18 12/18/18 12/19/18





 06:59 06:59 06:59


 


Intake Total 510 820 


 


Output Total 300  


 


Balance 210 820 


 


Weight 175 lb 7.807 oz 179 lb 10.828 oz 











General appearance: PRESENT: no acute distress, well-developed, well-nourished


Head exam: PRESENT: atraumatic, normocephalic


Eye exam: PRESENT: conjunctiva pink, EOMI, PERRLA.  ABSENT: scleral icterus


Ear exam: PRESENT: normal external ear exam


Mouth exam: PRESENT: moist, tongue midline


Neck exam: ABSENT: carotid bruit, JVD, lymphadenopathy, thyromegaly


Respiratory exam: PRESENT: clear to auscultation flor.  ABSENT: rales, rhonchi, 

wheezes


Cardiovascular exam: PRESENT: RRR.  ABSENT: diastolic murmur, rubs, systolic 

murmur


Pulses: PRESENT: normal dorsalis pedis pul


GI/Abdominal exam: PRESENT: normal bowel sounds, soft.  ABSENT: distended, 

guarding, mass, organolmegaly, rebound, tenderness


Rectal exam: PRESENT: deferred


Neurological exam: PRESENT: alert, awake, oriented to person, oriented to place,

oriented to time, oriented to situation, CN II-XII grossly intact.  ABSENT: 

motor sensory deficit





Results


Laboratory Results: 


                                        





                                 12/17/18 05:09 





                                 12/18/18 06:01 





                                        











  12/18/18





  06:01


 


Sodium  141.3


 


Potassium  4.0


 


Chloride  99


 


Carbon Dioxide  35 H


 


Anion Gap  7


 


BUN  15


 


Creatinine  0.81


 


Est GFR ( Amer)  > 60


 


Est GFR (Non-Af Amer)  > 60


 


Glucose  300 H


 


Calcium  9.7














Assessment & Plan





- Diagnosis


(1) DKA (diabetic ketoacidoses)


Qualifiers: 


   Diabetes mellitus type: type 2   Diabetes mellitus complication detail: 

without coma   Qualified Code(s): E11.10 - Type 2 diabetes mellitus with 

ketoacidosis without coma   


Is this a current diagnosis for this admission?: Yes   


Plan: 


Resolved.  Likely secondary to noncompliance. A1c is elevated at 10.8.  

Currently on Levemir 13 units every 12 and Humalog 13 units pre-meals.  Sugars 

are still running in the high 300s.  Will increase Levemir to 36 units every 12 

and increase Humalog to 15 units 3 times daily P meals.








(2) Acute kidney injury (nontraumatic)


Is this a current diagnosis for this admission?: Yes   


Plan: 


Resolved with IV fluids.








- Time


Time Spent with patient: 15-24 minutes

## 2018-12-19 RX ADMIN — Medication SCH: at 23:21

## 2018-12-19 RX ADMIN — INSULIN LISPRO SCH UNIT: 100 INJECTION, SOLUTION INTRAVENOUS; SUBCUTANEOUS at 08:53

## 2018-12-19 RX ADMIN — INSULIN LISPRO SCH UNIT: 100 INJECTION, SOLUTION INTRAVENOUS; SUBCUTANEOUS at 17:17

## 2018-12-19 RX ADMIN — FAMOTIDINE SCH MG: 20 TABLET, FILM COATED ORAL at 08:54

## 2018-12-19 RX ADMIN — INSULIN DETEMIR SCH UNITS: 100 INJECTION, SOLUTION SUBCUTANEOUS at 10:23

## 2018-12-19 RX ADMIN — DOCUSATE SODIUM SCH MG: 100 CAPSULE, LIQUID FILLED ORAL at 10:16

## 2018-12-19 RX ADMIN — INSULIN LISPRO SCH UNIT: 100 INJECTION, SOLUTION INTRAVENOUS; SUBCUTANEOUS at 10:57

## 2018-12-19 RX ADMIN — NYSTATIN SCH UNIT: 100000 SUSPENSION ORAL at 13:24

## 2018-12-19 RX ADMIN — METOCLOPRAMIDE HYDROCHLORIDE SCH MG: 10 TABLET ORAL at 08:54

## 2018-12-19 RX ADMIN — SUCRALFATE SCH GM: 1 SUSPENSION ORAL at 08:53

## 2018-12-19 RX ADMIN — HEPARIN SODIUM SCH: 5000 INJECTION, SOLUTION INTRAVENOUS; SUBCUTANEOUS at 13:20

## 2018-12-19 RX ADMIN — NYSTATIN SCH UNIT: 100000 SUSPENSION ORAL at 10:17

## 2018-12-19 RX ADMIN — NYSTATIN SCH UNIT: 100000 SUSPENSION ORAL at 17:14

## 2018-12-19 RX ADMIN — Medication SCH: at 06:54

## 2018-12-19 RX ADMIN — LISINOPRIL SCH MG: 10 TABLET ORAL at 10:17

## 2018-12-19 RX ADMIN — SUCRALFATE SCH: 1 SUSPENSION ORAL at 10:59

## 2018-12-19 RX ADMIN — FAMOTIDINE SCH MG: 20 TABLET, FILM COATED ORAL at 10:59

## 2018-12-19 RX ADMIN — DOCUSATE SODIUM SCH MG: 100 CAPSULE, LIQUID FILLED ORAL at 17:14

## 2018-12-19 RX ADMIN — NYSTATIN SCH UNIT: 100000 SUSPENSION ORAL at 22:41

## 2018-12-19 RX ADMIN — FAMOTIDINE SCH MG: 20 TABLET, FILM COATED ORAL at 16:10

## 2018-12-19 RX ADMIN — INSULIN DETEMIR SCH UNITS: 100 INJECTION, SOLUTION SUBCUTANEOUS at 22:43

## 2018-12-19 RX ADMIN — HEPARIN SODIUM SCH: 5000 INJECTION, SOLUTION INTRAVENOUS; SUBCUTANEOUS at 06:53

## 2018-12-19 RX ADMIN — METOCLOPRAMIDE HYDROCHLORIDE SCH MG: 10 TABLET ORAL at 10:59

## 2018-12-19 RX ADMIN — SUCRALFATE SCH GM: 1 SUSPENSION ORAL at 22:41

## 2018-12-19 RX ADMIN — FAMOTIDINE SCH MG: 20 TABLET, FILM COATED ORAL at 22:41

## 2018-12-19 RX ADMIN — HEPARIN SODIUM SCH: 5000 INJECTION, SOLUTION INTRAVENOUS; SUBCUTANEOUS at 21:27

## 2018-12-19 RX ADMIN — SUCRALFATE SCH: 1 SUSPENSION ORAL at 16:09

## 2018-12-19 RX ADMIN — Medication SCH ML: at 13:27

## 2018-12-19 RX ADMIN — METOCLOPRAMIDE HYDROCHLORIDE SCH MG: 10 TABLET ORAL at 16:10

## 2018-12-19 RX ADMIN — METOCLOPRAMIDE HYDROCHLORIDE SCH MG: 10 TABLET ORAL at 22:41

## 2018-12-19 NOTE — PDOC PROGRESS REPORT
Subjective


Progress Note for:: 12/19/18


Subjective:: 


This is a 43 yr old male with  PMH of insulin-dependent diabetes mellitus, 

history of DKA, noncompliance to diabetic medications, hypertension who was 

admitted with DKA.





He was initially on insulin drip and was on IV fluids.  Patient's DKA has 

resolved.





Sugars have been running high in the 300s today even with adjustment of his 

insulin regimen yesterday.  He is tolerating diet well.  He denies acute 

complaints.  Denies chest pain or shortness of breath abdominal pain.  





Unfortunately this morning, patient was told by RN to hold off on eating his 

breakfast before the pre-meal coverage.  However patient went ahead and he did 

not get his Humalog.








Reason For Visit: 


ACUTE DIABETIC KETOACIDOSIS








Physical Exam


Vital Signs: 


                                        











Temp Pulse Resp BP Pulse Ox


 


 98.4 F   81   16   127/75 H  94 


 


 12/19/18 16:19  12/19/18 16:19  12/19/18 16:19  12/19/18 16:19  12/19/18 16:19








                                 Intake & Output











 12/18/18 12/19/18 12/20/18





 06:59 06:59 06:59


 


Intake Total 820 1626 1380


 


Output Total  700 


 


Balance 


 


Weight 179 lb 10.828 oz 185 lb 10.067 oz 











General appearance: PRESENT: no acute distress, well-developed, well-nourished


Head exam: PRESENT: atraumatic, normocephalic


Eye exam: PRESENT: conjunctiva pink, EOMI, PERRLA.  ABSENT: scleral icterus


Ear exam: PRESENT: normal external ear exam


Mouth exam: PRESENT: moist, tongue midline


Neck exam: ABSENT: carotid bruit, JVD, lymphadenopathy, thyromegaly


Respiratory exam: PRESENT: clear to auscultation flor.  ABSENT: rales, rhonchi, 

wheezes


Cardiovascular exam: PRESENT: RRR.  ABSENT: diastolic murmur, rubs, systolic 

murmur


Pulses: PRESENT: normal dorsalis pedis pul


GI/Abdominal exam: PRESENT: normal bowel sounds, soft.  ABSENT: distended, 

guarding, mass, organolmegaly, rebound, tenderness


Rectal exam: PRESENT: deferred


Neurological exam: PRESENT: alert, awake, oriented to person, oriented to place,

oriented to time, oriented to situation, CN II-XII grossly intact.  ABSENT: 

motor sensory deficit





Results


Laboratory Results: 


                                        





                                 12/17/18 05:09 





                                 12/18/18 06:01 











Assessment & Plan





- Diagnosis


(1) DKA (diabetic ketoacidoses)


Qualifiers: 


   Diabetes mellitus type: type 2   Diabetes mellitus complication detail: 

without coma   Qualified Code(s): E11.10 - Type 2 diabetes mellitus with 

ketoacidosis without coma   


Is this a current diagnosis for this admission?: Yes   


Plan: 


Resolved.  Likely secondary to noncompliance. A1c is elevated at 10.8.    Sugars

are still running in the high 300s.  As mentioned, patient did not get his 

inguinal coverage at this morning as he went ahead and before getting his 

insulin.  Discussed this in length with nursing and patient.  Levemir at 40 

units every 12 and Humalog at 20 units 3 times daily pre meals.





It is unsafe for patient to be discharged with sugars in the 300s as he 

developed DKA due to being off an insulin regimen. Patient also does not have a 

PCP and will not be able to see one in the next few days. He can very likely go 

have recurrence of DKA if prematurely discharged with suboptimal glycemic 

control. Insulin regimen has been adjusted appropriately as patient has been of 

insulin for years and insulin response/naivety cannot be ascertaind definitively

at this time.











(2) Acute kidney injury (nontraumatic)


Is this a current diagnosis for this admission?: Yes   


Plan: 


Resolved with IV fluids.








- Time


Time Spent with patient: 15-24 minutes

## 2018-12-20 VITALS — DIASTOLIC BLOOD PRESSURE: 89 MMHG | SYSTOLIC BLOOD PRESSURE: 123 MMHG

## 2018-12-20 RX ADMIN — Medication SCH: at 15:20

## 2018-12-20 RX ADMIN — HEPARIN SODIUM SCH: 5000 INJECTION, SOLUTION INTRAVENOUS; SUBCUTANEOUS at 14:47

## 2018-12-20 RX ADMIN — DOCUSATE SODIUM SCH: 100 CAPSULE, LIQUID FILLED ORAL at 09:32

## 2018-12-20 RX ADMIN — INSULIN LISPRO SCH UNIT: 100 INJECTION, SOLUTION INTRAVENOUS; SUBCUTANEOUS at 08:01

## 2018-12-20 RX ADMIN — METOCLOPRAMIDE HYDROCHLORIDE SCH MG: 10 TABLET ORAL at 08:00

## 2018-12-20 RX ADMIN — FAMOTIDINE SCH MG: 20 TABLET, FILM COATED ORAL at 07:59

## 2018-12-20 RX ADMIN — FAMOTIDINE SCH MG: 20 TABLET, FILM COATED ORAL at 12:09

## 2018-12-20 RX ADMIN — METOCLOPRAMIDE HYDROCHLORIDE SCH MG: 10 TABLET ORAL at 12:09

## 2018-12-20 RX ADMIN — NYSTATIN SCH UNIT: 100000 SUSPENSION ORAL at 09:30

## 2018-12-20 RX ADMIN — NYSTATIN SCH: 100000 SUSPENSION ORAL at 15:20

## 2018-12-20 RX ADMIN — SUCRALFATE SCH GM: 1 SUSPENSION ORAL at 08:01

## 2018-12-20 RX ADMIN — SUCRALFATE SCH GM: 1 SUSPENSION ORAL at 12:09

## 2018-12-20 RX ADMIN — Medication SCH ML: at 06:09

## 2018-12-20 RX ADMIN — LISINOPRIL SCH MG: 10 TABLET ORAL at 09:29

## 2018-12-20 RX ADMIN — INSULIN LISPRO SCH UNIT: 100 INJECTION, SOLUTION INTRAVENOUS; SUBCUTANEOUS at 12:09

## 2018-12-20 RX ADMIN — HEPARIN SODIUM SCH: 5000 INJECTION, SOLUTION INTRAVENOUS; SUBCUTANEOUS at 06:09

## 2018-12-20 RX ADMIN — INSULIN DETEMIR SCH UNITS: 100 INJECTION, SOLUTION SUBCUTANEOUS at 09:31

## 2018-12-20 NOTE — PDOC DISCHARGE SUMMARY
General





- Admit/Disc Date/PCP


Admission Date/Primary Care Provider: 


  12/14/18 00:41





  





Discharge Date: 12/20/18





- Discharge Diagnosis


(1) DKA (diabetic ketoacidoses)


Is this a current diagnosis for this admission?: Yes   





(2) Acute kidney injury (nontraumatic)


Is this a current diagnosis for this admission?: Yes   





- Additional Information


Resuscitation Status: Full Code


Discharge Diet: Diabetic


Discharge Activity: Activity As Tolerated


Prescriptions: 


Insulin Detemir [Levemir Insulin 100 units/mL] 40 unit SUBCUT Q12 #10 [email or web address]


Insulin Lispro [Humalog Insulin (Lispro) 100 unit/mL] 20 unit SUBCUT AC #3 unit


Lancets 1 each MC BID #1 kit


Home Medications: 








Insulin Detemir [Levemir Insulin 100 units/mL] 40 unit SUBCUT Q12 #10 [email or web address].pen

12/20/18 


Insulin Lispro [Humalog Insulin (Lispro) 100 unit/mL] 20 unit SUBCUT AC #3 unit 

12/20/18 


Lancets 1 each MC BID #1 kit 12/20/18 











History of Present Illness


History of Present Illness: 


Admitting hospitalist's H&P:





TASHI ANDINO is a 43 year old male who presented to the emergency room with a 

one-week history of progressively worsening nausea and weakness.  He admits to 

the associated symptoms of generalized malaise and anorexia for the last 5 days 

with a severely decreased oral intake of fluids (despite extreme thirst) 

associated with a severely dry mouth and thick whitish secretions covering his 

tongue and causing it to be sore.  He admits that he is a insulin-dependent type

2 diabetic with prior similar symptoms on one occasion due to diabetic 

ketoacidosis.  He recently came to this area from [place] without bringing a black

pply of insulin or his glucometer and testing strips.  He has not obtained 

insulin locally and therefore he has not been treated for more than 1 week.  He 

usually uses a short acting insulin on a sliding scale prior to meals as 

instructed by his primary care provider in [place].  In the emergency room he 

was found to have a pH of 7.09, a serum bicarbonate of 9, a creatinine of 1.4 

and a glucose of 661 with a urinalysis positive for 3+ ketones and 4+ glucose.  

He was also noted to be tachycardic and tachypneic, but was afebrile and 

normotensive.  Thus with a confirmed diagnosis of diabetic ketoacidosis he was 

admitted to the intensive care unit with an intravenous insulin infusion 

titrated per protocol, and intravenous bicarbonate infusion and morphine sulfate

on a sliding scale as needed for pain or discomfort.  He will also receive high-

volume IV fluids to replete his vascular volume and treat his dehydration and 

acute renal injury.











Hospital Course


Hospital Course: 





This is a 43 yr old male with  PMH of insulin-dependent diabetes mellitus, 

history of DKA, noncompliance to diabetic medications, hypertension who was 

admitted with DKA.





He was initially on insulin drip and was on IV fluids.  





Patient's DKA did resolve but his sugars were initially difficult to control. 

His DKA was from non-compliance as he did say he was on insulin regimen but 

self-discontinued as he thought dietary restriction and weight loss will 

suffice.





Patient appear to have not received adequate diabetic education. He was 

restarted on Levemir and scheduled premeal coverage. This was continuously 

adjusted with the aim to at least control sugars down to 200-300s. Patient did 

feel sweaty, dizzy and weak when he went down to the low 200s (234). He was told

this is not unusual and is likely autonomic in nature due to his body being used

to elevated sugars at home. Explained in length we will just be aiming for 

sugars in the high 200s and will not aim for <200 at the mean time. Dietitian 

also saw patient for DM diet education. He will continue to check his sugars at 

home and closely follow up with his new PCP for continuous apppropriate 

uptitration of his insulin regimen. He was also given an appointment for a new 

PCP.





Physical Exam


Vital Signs: 


                                        











Temp Pulse Resp BP Pulse Ox


 


 98.5 F   98   22 H  123/89 H  99 


 


 12/20/18 14:36  12/20/18 14:36  12/20/18 14:36  12/20/18 14:36  12/20/18 14:36








                                 Intake & Output











 12/19/18 12/20/18 12/21/18





 06:59 06:59 06:59


 


Intake Total 1626 3202 


 


Output Total 700 550 


 


Balance 926 2652 


 


Weight 185 lb 10.067 oz 181 lb 10.574 oz 











General appearance: PRESENT: no acute distress, well-developed, well-nourished


Head exam: PRESENT: atraumatic, normocephalic


Eye exam: PRESENT: conjunctiva pink, EOMI, PERRLA.  ABSENT: scleral icterus


Ear exam: PRESENT: normal external ear exam


Mouth exam: PRESENT: moist, tongue midline


Neck exam: ABSENT: carotid bruit, JVD, lymphadenopathy, thyromegaly


Respiratory exam: PRESENT: clear to auscultation flor.  ABSENT: rales, rhonchi, 

wheezes


Cardiovascular exam: PRESENT: RRR.  ABSENT: diastolic murmur, rubs, systolic 

murmur


Pulses: PRESENT: normal dorsalis pedis pul


GI/Abdominal exam: PRESENT: normal bowel sounds, soft.  ABSENT: distended, guar

ding, mass, organolmegaly, rebound, tenderness


Rectal exam: PRESENT: deferred


Neurological exam: PRESENT: alert, awake, oriented to person, oriented to place,

oriented to time, oriented to situation, CN II-XII grossly intact.  ABSENT: 

motor sensory deficit





Results


Laboratory Results: 


                                        





                                 12/17/18 05:09 





                                 12/18/18 06:01 











Qualifiers





- *


PATIENT BEING DISCHARGED WITH ANY OF THE FOLLOWING DIAGNOSIS: No

## 2018-12-24 ENCOUNTER — HOSPITAL ENCOUNTER (EMERGENCY)
Dept: HOSPITAL 62 - ER | Age: 43
Discharge: HOME | End: 2018-12-24
Payer: COMMERCIAL

## 2018-12-24 VITALS — DIASTOLIC BLOOD PRESSURE: 91 MMHG | SYSTOLIC BLOOD PRESSURE: 135 MMHG

## 2018-12-24 DIAGNOSIS — E10.65: Primary | ICD-10-CM

## 2018-12-24 DIAGNOSIS — Z79.4: ICD-10-CM

## 2018-12-24 LAB
ADD MANUAL DIFF: NO
ALBUMIN SERPL-MCNC: 3.8 G/DL (ref 3.5–5)
ALP SERPL-CCNC: 77 U/L (ref 38–126)
ALT SERPL-CCNC: 215 U/L (ref 21–72)
ANION GAP SERPL CALC-SCNC: 9 MMOL/L (ref 5–19)
AST SERPL-CCNC: 48 U/L (ref 17–59)
BASE EXCESS BLDV CALC-SCNC: 0.9 MMOL/L
BASOPHILS # BLD AUTO: 0 10^3/UL (ref 0–0.2)
BASOPHILS NFR BLD AUTO: 0.3 % (ref 0–2)
BILIRUB DIRECT SERPL-MCNC: 0.3 MG/DL (ref 0–0.4)
BILIRUB SERPL-MCNC: 0.4 MG/DL (ref 0.2–1.3)
BUN SERPL-MCNC: 16 MG/DL (ref 7–20)
CALCIUM: 9.2 MG/DL (ref 8.4–10.2)
CHLORIDE SERPL-SCNC: 98 MMOL/L (ref 98–107)
CO2 SERPL-SCNC: 27 MMOL/L (ref 22–30)
EOSINOPHIL # BLD AUTO: 0.1 10^3/UL (ref 0–0.6)
EOSINOPHIL NFR BLD AUTO: 1.2 % (ref 0–6)
ERYTHROCYTE [DISTWIDTH] IN BLOOD BY AUTOMATED COUNT: 12.8 % (ref 11.5–14)
GLUCOSE SERPL-MCNC: 341 MG/DL (ref 75–110)
HCO3 BLDV-SCNC: 27 MMOL/L (ref 20–32)
HCT VFR BLD CALC: 46.3 % (ref 37.9–51)
HGB BLD-MCNC: 15.5 G/DL (ref 13.5–17)
LYMPHOCYTES # BLD AUTO: 2.7 10^3/UL (ref 0.5–4.7)
LYMPHOCYTES NFR BLD AUTO: 34.1 % (ref 13–45)
MCH RBC QN AUTO: 31.2 PG (ref 27–33.4)
MCHC RBC AUTO-ENTMCNC: 33.4 G/DL (ref 32–36)
MCV RBC AUTO: 93 FL (ref 80–97)
MONOCYTES # BLD AUTO: 0.8 10^3/UL (ref 0.1–1.4)
MONOCYTES NFR BLD AUTO: 9.6 % (ref 3–13)
NEUTROPHILS # BLD AUTO: 4.4 10^3/UL (ref 1.7–8.2)
NEUTS SEG NFR BLD AUTO: 54.8 % (ref 42–78)
PCO2 BLDV: 47.8 MMHG (ref 35–63)
PH BLDV: 7.37 [PH] (ref 7.3–7.42)
PLATELET # BLD: 263 10^3/UL (ref 150–450)
POTASSIUM SERPL-SCNC: 4.3 MMOL/L (ref 3.6–5)
PROT SERPL-MCNC: 6.3 G/DL (ref 6.3–8.2)
RBC # BLD AUTO: 4.97 10^6/UL (ref 4.35–5.55)
SODIUM SERPL-SCNC: 134.2 MMOL/L (ref 137–145)
TOTAL CELLS COUNTED % (AUTO): 100 %
WBC # BLD AUTO: 8 10^3/UL (ref 4–10.5)

## 2018-12-24 PROCEDURE — 82803 BLOOD GASES ANY COMBINATION: CPT

## 2018-12-24 PROCEDURE — 85025 COMPLETE CBC W/AUTO DIFF WBC: CPT

## 2018-12-24 PROCEDURE — 96360 HYDRATION IV INFUSION INIT: CPT

## 2018-12-24 PROCEDURE — 82962 GLUCOSE BLOOD TEST: CPT

## 2018-12-24 PROCEDURE — 99285 EMERGENCY DEPT VISIT HI MDM: CPT

## 2018-12-24 PROCEDURE — 80053 COMPREHEN METABOLIC PANEL: CPT

## 2018-12-24 PROCEDURE — 36415 COLL VENOUS BLD VENIPUNCTURE: CPT

## 2018-12-24 NOTE — ER DOCUMENT REPORT
ED General





- General


Chief Complaint: High Blood Sugar


Stated Complaint: BLOOD SUGAR ISSUE


Time Seen by Provider: 12/24/18 11:18


Notes: 





Patient is here because he cannot get his insulin prescriptions filled because 

they cost too much.  Patient is visiting here from [place] and has Medicaid 

from that state.  He was recently in this hospital and started on insulin for 

high blood sugars.  He was discharged with a prescription for Humulin and 

Levemir insulin.  He was unable to fill these prescriptions because his [place]

Medicaid did not cover the medications and he did not have the thousand dollars 

that it cost for this medicine.  He was discharged from here on Thursday.  He is

not had any insulin since then.  He is complaining of poor appetite, drinking 

liquids excessively, blurry vision, but no nausea or vomiting.  No fevers.





TRAVEL OUTSIDE OF THE [place] IN LAST 30 DAYS: No





- Related Data


Allergies/Adverse Reactions: 


                                        





No Known Allergies Allergy (Verified 12/24/18 11:11)


   











Past Medical History





- Social History


Smoking Status: Former Smoker


Chew tobacco use (# tins/day): No


Frequency of alcohol use: None


Drug Abuse: None


Family History: Reviewed & Not Pertinent


Patient has suicidal ideation: No


Patient has homicidal ideation: No





- Past Medical History


Cardiac Medical History: Reports: Hx Hypertension - Not taking any medications


Endocrine Medical History: Reports: Hx Diabetes Mellitus Type 1.  Denies: Hx 

Hyperthyroidism, Hx Hypothyroidism





Review of Systems





- Review of Systems


Notes: 





REVIEW OF SYSTEMS:





CONSTITUTIONAL :  Denies fever.


  


EENT:   Denies eye, ear, nose or mouth or throat pain or other symptoms.





CARDIOVASCULAR:  Denies chest pain.





RESPIRATORY:  Denies cough, chest congestion, or shortness of breath.





GASTROINTESTINAL:  Denies abdominal pain or nausea, vomiting, or diarrhea.  

Drinking lots of fluids.  Not eating well.  No appetite.





GENITOURINARY:  Denies difficulty or painful urinating, urinary frequency, blood

in urine.





MUSCULOSKELETAL:  Denies back or neck pain.  Denies joint pain or swelling.





SKIN:   Denies rash or skin lesions.





NEUROLOGICAL:  Denies LOC or altered mental status.  Denies headache.  Denies 

sensory loss or motor deficits.





ALL OTHER SYSTEMS REVIEWED AND NEGATIVE.





Physical Exam





- Vital signs


Vitals: 


                                        











Temp Pulse Resp BP Pulse Ox


 


 98.1 F   95   20   140/87 H  97 


 


 12/24/18 11:16  12/24/18 11:16  12/24/18 11:16  12/24/18 11:16  12/24/18 11:16











Interpretation: Normal


Notes: 





PHYSICAL EXAMINATION:





GENERAL: Well-appearing, in no acute distress.  Vital signs normal.





HEAD: Atraumatic, normocephalic.





EYES: Pupils equal round and reactive to light, extraocular movements intact.





ENT: oropharynx clear without exudates.  Moist mucous membranes.





NECK: Normal range of motion, supple.





LUNGS: Breath sounds clear and equal bilaterally.





HEART: Regular rate and rhythm without murmurs.





ABDOMEN: Soft, nontender.  No guarding or rebound.  No masses.





BACK:  No tenderness throughout entire back.





EXTREMITIES: Normal range of motion without pain.





NEUROLOGICAL:  Normal speech, normal gait.  Normal sensory, motor, and reflex 

exams.  Awake, alert, and oriented x3.  Cranial nerves normal.





PSYCH: Normal mood, normal affect.





SKIN: Warm, dry, no rashes.





Course





- Re-evaluation


Re-evalutation: 





12/24/18 18:09


Patient's blood sugar was elevated moderately.  Labs do not look like 

ketoacidosis.  The patient was given a liter of saline and some insulin, ordered

in triage.





We were able to arrange for the patient to get enough Levemir insulin to last 

for about 4 days.  He will just have to go without the sliding scale Humulin 

insulin.





Discharge planner, Mihai Martinez, worked on trying to find some other 

alternative source for the patient's medicines, but basically it comes down to 

the fact that his home state Medicaid will not pay for this medication.  








- Vital Signs


Vital signs: 


                                        











Temp Pulse Resp BP Pulse Ox


 


 98.8 F   67   16   135/91 H  98 


 


 12/24/18 14:54  12/24/18 14:54  12/24/18 14:54  12/24/18 14:54  12/24/18 14:54














- Laboratory


Result Diagrams: 


                                 12/24/18 12:17





                                 12/24/18 13:05


Laboratory results interpreted by me: 


                                        











  12/24/18 12/24/18





  13:05 13:38


 


Sodium  134.2 L 


 


Glucose  341 H 


 


POC Glucose   278 H


 


ALT  215 H 














Discharge





- Discharge


Clinical Impression: 


 Hyperglycemia





Condition: Stable


Disposition: HOME, SELF-CARE


Additional Instructions: 


HYPERGLYCEMIA (HIGH BLOOD SUGAR):





     You have an abnormally high blood sugar. Not all high blood sugar requires 

long-term treatment.  High blood sugar can be due to medications, pregnancy, or 

the stress of illness.  (These cases are "borderline diabetes.")  If the doctor 

feels your high blood sugar might resolve with time, you may not require 

treatment now.


     It's very important that you follow through, to see if the blood sugar 

returns to normal levels.  Uncontrolled high blood sugar leads to early heart 

disease, strokes, nerve damage, eye damage, and kidney damage.


     Call the physician if there is faintness, excess sleepiness, or very rapid 

breathing.








DIABETES:





     You have an abnormally high blood sugar, suspicious for diabetes. Not all 

high blood sugar requires long-term treatment.  High blood sugar can be due to 

medications, pregnancy, or the stress of illness. (These cases are "borderline d

iabetes.")  If the doctor feels your high blood sugar might get better with 

time, you may not require treatment now.


     It's very important that you follow through.  Uncontrolled high blood sugar

leads to early heart disease, strokes, nerve damage, eye damage, and kidney 

damage.


     All diabetics should follow a diet designed to control the blood sugar.  

Overweight diabetics should exercise regularly and lose weight. If this is not 

sufficient to control the blood sugar, pills or insulin


shots are necessary.  Younger people who develop diabetes almost always require 

insulin daily.


     Home testing of blood sugars or urine sugar is required. Diabetic teaching 

is available to help you figure insulin doses and monitor the blood sugar.


     Call the physician if there is faintness, excess sleepiness, or very rapid 

breathing.  If hypoglycemia (LOW blood sugar) develops, symptoms are shakiness, 

weakness, sweating, and confusion.  In this case, you should eat or drink 

something with sugar at once.








INSULIN:


     Insulin is a natural hormone that lowers blood sugar.  Normal blood sugar p

revents complications of diabetes.  For most diabetics, insulin is the best way 

to treat the illness.


     Be sure you know how to measure the insulin correctly.  Insulin is measured

in "units."  There are three types of insulin: N (NPH or long acting), R 

(regular or short acting), and L (Lente or very long acting).  Be sure you are 

using the right amount of each type.


     Insulin must be injected into the fat.  You can use the abdomen, upper 

arms, and thighs.  Select a different injection site every time. Wipe the site 

with alcohol before injecting.


     When first starting insulin, some adjusting of the insulin dose is 

necessary.  Keep a record of each insulin dose and time of injection, and of the

blood sugar and the time you test it.


     Sometimes insulin can make the blood sugar too low.  If you become dizzy, 

sweaty, shaky, or confused, you may be having a hypoglycemic episode.  I

mmediately use juice or some other sweet food.  Call the doctor if the symptoms 

don't go away.





Use the Levemir pen that you have to take 40 units twice a day.  Try to get the 

prescription filled for the other insulin at any pharmacy locally.  If you are 

unable to do so, the only thing I can suggest is a return to [place] to get her

medications under your [place] Medicaid.





FOLLOW-UP CARE:


If you have been referred to a physician for follow-up care, call the 

physicians office for an appointment as you were instructed or within the next 

two days.  If you experience worsening or a significant change in your symptoms,

notify the physician immediately or return to the Emergency Department at any 

time for re-evaluation.

## 2018-12-24 NOTE — ER DOCUMENT REPORT
ED Medical Screen (RME)





- General


Chief Complaint: High Blood Sugar


Stated Complaint: BLOOD SUGAR ISSUE


Time Seen by Provider: 12/24/18 11:18


TRAVEL OUTSIDE OF THE [place] IN LAST 30 DAYS: No





- Related Data


Allergies/Adverse Reactions: 


                                        





No Known Allergies Allergy (Verified 12/24/18 11:11)


   











Past Medical History





- Social History


Chew tobacco use (# tins/day): No


Frequency of alcohol use: None


Drug Abuse: None





- Past Medical History


Cardiac Medical History: Reports: Hx Hypertension - Not taking any medications


   Denies: Hx Coronary Artery Disease


Pulmonary Medical History: 


   Denies: Hx Asthma, Hx COPD


Neurological Medical History: Denies: Hx Migraine, Hx Seizures


Endocrine Medical History: Reports: Hx Diabetes Mellitus Type 2.  Denies: Hx 

Diabetes Mellitus Type 1, Hx Hyperthyroidism, Hx Hypothyroidism


Renal/ Medical History: Denies: Hx Peritoneal Dialysis


GI Medical History: Denies: Hx Cirrhosis, Hx Hepatitis


Musculoskeltal Medical History: Denies Hx Arthritis, Denies Hx Fibromyalgia


Skin Medical History: Denies Hx Eczema, Denies Hx Psoriasis


Psychiatric Medical History: 


   Denies: Hx Depression


Infectious Medical History: Denies: Hx Hepatitis





Physical Exam





- Vital signs


Vitals: 





                                        











Temp Pulse Resp BP Pulse Ox


 


 98.1 F   95   20   140/87 H  97 


 


 12/24/18 11:16  12/24/18 11:16  12/24/18 11:16  12/24/18 11:16  12/24/18 11:16














Course





- Re-evaluation


Re-evalutation: 





12/24/18 12:46


Here is a 43-year-old type I diabetic who has been without insulin times the 

last 5 days.


His blood sugar has been over 400.


Says he feels a little bit unwell with some blurring of the vision but not quite

as sick as he did last time.


We will obtain a blood glucose, will administer insulin.


His primary issue is related to obtaining appropriate insulin because of his out

of state Medicaid.


Have contacted on-call  to try to assist with this.


I will plan for this patient undergo further investigation evaluation by 

secondary provider, the the appropriate diagnostics, disposition and workup will

be deferred to that provider.  I have performed a rapid screening examination 

and they will require further evaluation.





- Vital Signs


Vital signs: 





                                        











Temp Pulse Resp BP Pulse Ox


 


 98.1 F   95   20   140/87 H  97 


 


 12/24/18 11:16  12/24/18 11:16  12/24/18 11:16  12/24/18 11:16  12/24/18 11:16














- Laboratory


Result Diagrams: 


                                 12/24/18 12:17





                                 12/24/18 12:17

## 2019-01-01 ENCOUNTER — HOSPITAL ENCOUNTER (EMERGENCY)
Dept: HOSPITAL 62 - ER | Age: 44
Discharge: HOME | End: 2019-01-01
Payer: COMMERCIAL

## 2019-01-01 VITALS — DIASTOLIC BLOOD PRESSURE: 86 MMHG | SYSTOLIC BLOOD PRESSURE: 122 MMHG

## 2019-01-01 DIAGNOSIS — Z91.120: ICD-10-CM

## 2019-01-01 DIAGNOSIS — T38.3X6A: ICD-10-CM

## 2019-01-01 DIAGNOSIS — I10: ICD-10-CM

## 2019-01-01 DIAGNOSIS — E11.65: Primary | ICD-10-CM

## 2019-01-01 DIAGNOSIS — Z91.14: ICD-10-CM

## 2019-01-01 PROCEDURE — 99283 EMERGENCY DEPT VISIT LOW MDM: CPT

## 2019-01-01 PROCEDURE — 82962 GLUCOSE BLOOD TEST: CPT

## 2019-01-01 NOTE — ER DOCUMENT REPORT
ED Medical Screen (RME)





- General


Chief Complaint: High Blood Sugar


Stated Complaint: BLOOD SUGAR ISSUE


Time Seen by Provider: 01/01/19 10:46


Notes: 





Patient was admitted to this hospital last week for hyperglycemia and DKA.  He 

was discharged with an order to get Levemir insulin, but it was on a cost over 

thousand dollars and he is only covered by [place] Medicaid and they will pay 

for medications here in [place].  Patient had been advised to consider 

returning to his home state of [place] to get his insulin.  Patient 

alternatively was given a prescription for NPH insulin 7030, but pharmacy did 

not fill that one as an alternative.  Patient says he saw the hospitalist, Dr. Diaz, last evening and a market and he was advised to return to the emergency 

department today to get his insulin worked out.





I spoke to Dr. Walker and he said he would take care of the problem.  He and a 

discharge planner came to the emergency department and talk to the patient.





Dr. Diaz came and talked to the patient and advised him regarding his insulin 

needs.


TRAVEL OUTSIDE OF THE [place] IN LAST 30 DAYS: No





- Related Data


Allergies/Adverse Reactions: 


                                        





No Known Allergies Allergy (Verified 12/24/18 11:11)


   











Past Medical History





- Social History


Chew tobacco use (# tins/day): No


Frequency of alcohol use: None


Drug Abuse: None


Family history: Reviewed & Not Pertinent





- Past Medical History


Cardiac Medical History: Reports: Hx Hypertension - Not taking any medications


   Denies: Hx Coronary Artery Disease


Pulmonary Medical History: 


   Denies: Hx Asthma, Hx COPD


Neurological Medical History: Denies: Hx Migraine, Hx Seizures


Endocrine Medical History: Reports: Hx Diabetes Mellitus Type 1, Hx Diabetes 

Mellitus Type 2.  Denies: Hx Hyperthyroidism, Hx Hypothyroidism


GI Medical History: Denies: Hx Cirrhosis, Hx Hepatitis


Musculoskeltal Medical History: Denies Hx Arthritis, Denies Hx Fibromyalgia


Skin Medical History: Denies Hx Eczema, Denies Hx Psoriasis


Psychiatric Medical History: 


   Denies: Hx Depression


Infectious Medical History: Denies: Hx Hepatitis





Physical Exam





- Vital signs


Vitals: 


                                        











Temp Pulse Resp BP Pulse Ox


 


 98.4 F   84   16   122/86 H  96 


 


 01/01/19 10:16  01/01/19 10:16  01/01/19 10:16  01/01/19 10:16  01/01/19 10:16














Course





- Re-evaluation


Re-evalutation: 





01/01/19 20:18


Accu-Chek blood sugar was about 370





- Vital Signs


Vital signs: 


                                        











Temp Pulse Resp BP Pulse Ox


 


 98.4 F   84   16   122/86 H  96 


 


 01/01/19 10:16  01/01/19 10:16  01/01/19 10:16  01/01/19 10:16  01/01/19 10:16














- Laboratory


Laboratory results interpreted by me: 


                                        











  01/01/19





  10:29


 


POC Glucose  339 H














Doctor's Discharge





- Discharge


Clinical Impression: 


 Hyperglycemia





Condition: Stable


Disposition: HOME, SELF-CARE


Additional Instructions: 


HYPERGLYCEMIA (HIGH BLOOD SUGAR):





     You have an abnormally high blood sugar. Not all high blood sugar requires 

long-term treatment.  High blood sugar can be due to medications, pregnancy, or 

the stress of illness.  (These cases are "borderline diabetes.")  If the doctor 

feels your high blood sugar might resolve with time, you may not require 

treatment now.


     It's very important that you follow through, to see if the blood sugar 

returns to normal levels.  Uncontrolled high blood sugar leads to early heart 

disease, strokes, nerve damage, eye damage, and kidney damage.


     Call the physician if there is faintness, excess sleepiness, or very rapid 

breathing.








DIABETES:





     You have an abnormally high blood sugar, suspicious for diabetes. Not all 

high blood sugar requires long-term treatment.  High blood sugar can be due to 

medications, pregnancy, or the stress of illness. (These cases are "borderline 

diabetes.")  If the doctor feels your high blood sugar might get better with 

time, you may not require treatment now.


     It's very important that you follow through.  Uncontrolled high blood sugar

leads to early heart disease, strokes, nerve damage, eye damage, and kidney 

damage.


     All diabetics should follow a diet designed to control the blood sugar.  

Overweight diabetics should exercise regularly and lose weight. If this is not 

sufficient to control the blood sugar, pills or insulin


shots are necessary.  Younger people who develop diabetes almost always require 

insulin daily.


     Home testing of blood sugars or urine sugar is required. Diabetic teaching 

is available to help you figure insulin doses and monitor the blood sugar.


     Call the physician if there is faintness, excess sleepiness, or very rapid 

breathing.  If hypoglycemia (LOW blood sugar) develops, symptoms are shakiness, 

weakness, sweating, and confusion.  In this case, you should eat or drink 

something with sugar at once.








INSULIN:


     Insulin is a natural hormone that lowers blood sugar.  Normal blood sugar 

prevents complications of diabetes.  For most diabetics, insulin is the best way

to treat the illness.


     Be sure you know how to measure the insulin correctly.  Insulin is measured

in "units."  There are three types of insulin: N (NPH or long acting), R (r

egular or short acting), and L (Lente or very long acting).  Be sure you are 

using the right amount of each type.


     Insulin must be injected into the fat.  You can use the abdomen, upper 

arms, and thighs.  Select a different injection site every time. Wipe the site 

with alcohol before injecting.


     When first starting insulin, some adjusting of the insulin dose is 

necessary.  Keep a record of each insulin dose and time of injection, and of the

blood sugar and the time you test it.


     Sometimes insulin can make the blood sugar too low.  If you become dizzy, 

sweaty, shaky, or confused, you may be having a hypoglycemic episode.  

Immediately use juice or some other sweet food.  Call the doctor if the symptoms

don't go away.








FOLLOW-UP CARE:


If you have been referred to a physician for follow-up care, call the 

physicians office for an appointment as you were instructed or within the next 

two days.  If you experience worsening or a significant change in your symptoms,

notify the physician immediately or return to the Emergency Department at any 

time for re-evaluation.